# Patient Record
Sex: FEMALE | Race: WHITE | URBAN - METROPOLITAN AREA
[De-identification: names, ages, dates, MRNs, and addresses within clinical notes are randomized per-mention and may not be internally consistent; named-entity substitution may affect disease eponyms.]

---

## 2023-07-21 LAB
AMB EXT BILIRUBIN, TOTAL: 0.8 MG/DL
AMB EXT BUN: 21 MG/DL
AMB EXT CALCIUM: 9.6
AMB EXT CARBON DIOXIDE: 25
AMB EXT CHLORIDE: 104
AMB EXT CHOLESTEROL, TOTAL: 207 MG/DL
AMB EXT CMP ALT: 20 U/L
AMB EXT CMP AST: 14 U/L
AMB EXT CREATININE: 0.59 MG/DL
AMB EXT GLUCOSE: 122 MG/DL
AMB EXT HDL CHOLESTEROL: 49 MG/DL
AMB EXT HGBA1C: 5.9 %
AMB EXT LDL CHOLESTEROL, DIRECT: 139 MG/DL
AMB EXT POSTASSIUM: 4.3 MMOL/L
AMB EXT SODIUM: 143 MMOL/L
AMB EXT TOTAL PROTEIN: 6.4
AMB EXT TRIGLYCERIDES: 107 MG/DL
AMB EXT TSH: 1.14 MIU/ML
AMB EXT VLDL: 19 MG/DL

## 2023-08-14 ENCOUNTER — TELEPHONE (OUTPATIENT)
Dept: ENDOCRINOLOGY CLINIC | Facility: CLINIC | Age: 63
End: 2023-08-14

## 2023-08-14 ENCOUNTER — OFFICE VISIT (OUTPATIENT)
Dept: ENDOCRINOLOGY CLINIC | Facility: CLINIC | Age: 63
End: 2023-08-14
Payer: COMMERCIAL

## 2023-08-14 VITALS
DIASTOLIC BLOOD PRESSURE: 78 MMHG | OXYGEN SATURATION: 100 % | HEART RATE: 57 BPM | BODY MASS INDEX: 17 KG/M2 | SYSTOLIC BLOOD PRESSURE: 126 MMHG | RESPIRATION RATE: 20 BRPM | WEIGHT: 103 LBS

## 2023-08-14 DIAGNOSIS — E78.2 MIXED HYPERLIPIDEMIA: ICD-10-CM

## 2023-08-14 DIAGNOSIS — I10 PRIMARY HYPERTENSION: ICD-10-CM

## 2023-08-14 DIAGNOSIS — R73.03 PREDIABETES: Primary | ICD-10-CM

## 2023-08-14 LAB
CREAT UR-SCNC: 23.7 MG/DL
EGFR-CR: 100 ML/MIN/1.73M2 (ref 60–?)
MICROALBUMIN UR-MCNC: 4.4 MG/DL
MICROALBUMIN/CREAT 24H UR-RTO: 185.7 UG/MG (ref ?–30)

## 2023-08-14 PROCEDURE — 82043 UR ALBUMIN QUANTITATIVE: CPT | Performed by: NURSE PRACTITIONER

## 2023-08-14 PROCEDURE — 82570 ASSAY OF URINE CREATININE: CPT | Performed by: NURSE PRACTITIONER

## 2023-08-14 RX ORDER — IRBESARTAN 150 MG/1
150 TABLET ORAL NIGHTLY
COMMUNITY

## 2023-08-14 RX ORDER — METOPROLOL SUCCINATE 50 MG/1
50 TABLET, EXTENDED RELEASE ORAL DAILY
COMMUNITY

## 2023-08-14 RX ORDER — ALPRAZOLAM 0.5 MG/1
0.5 TABLET, EXTENDED RELEASE ORAL EVERY MORNING
COMMUNITY

## 2023-08-14 RX ORDER — OMEGA-3/DHA/EPA/FISH OIL 60 MG-90MG
CAPSULE ORAL
COMMUNITY

## 2023-08-14 RX ORDER — BLOOD-GLUCOSE SENSOR
1 EACH MISCELLANEOUS
Qty: 2 EACH | Refills: 11 | Status: SHIPPED | OUTPATIENT
Start: 2023-08-14

## 2023-08-14 RX ORDER — ASCORBIC ACID 500 MG
500 TABLET ORAL DAILY
COMMUNITY

## 2023-08-16 DIAGNOSIS — R73.03 PREDIABETES: Primary | ICD-10-CM

## 2023-09-22 ENCOUNTER — DIABETIC EDUCATION (OUTPATIENT)
Dept: ENDOCRINOLOGY CLINIC | Facility: CLINIC | Age: 63
End: 2023-09-22
Payer: COMMERCIAL

## 2023-09-22 VITALS — WEIGHT: 103.38 LBS | BODY MASS INDEX: 16.61 KG/M2 | HEIGHT: 65.98 IN

## 2023-09-22 DIAGNOSIS — R73.03 PREDIABETES: Primary | ICD-10-CM

## 2023-09-22 NOTE — PATIENT INSTRUCTIONS
Irina Hand, it was a pleasure meeting you today. Below is a summary of our visit. Recommendations  Practice healthful eating patterns, emphasizing a variety of nutrient dense foods in appropriate portion sizes. Achieve and maintain weight gain goals, increase wt by 5%. Increase carb intake to at least 15 grams per meal/snack. Make sure your drinking at least 40 oz of water daily    Blood Glucose Goals  Blood sugar goals: less than 125 mg/dl in the morning and less than 150 mg/dl 2 hours after meals. Keep glucose tabs or fast acting carbohydrates with you for treatment of lows; for blood glucose levels less than 70 mg/dl, take 15 grams of fast acting carbohydrates (3-4 glucose tabs, 4 ounces of juice, or as discussed). Retest in 15 min. If not above 70 mg/dl, retreat. Follow up: Recommended in 1 month  Please call the [facility] with any questions or concerns [phone].     Saira Cat RDN, LDN, [facility]  Certified Diabetes Care and   Registered Dietitian

## 2023-10-18 ENCOUNTER — LAB ENCOUNTER (OUTPATIENT)
Dept: LAB | Age: 63
End: 2023-10-18
Attending: NURSE PRACTITIONER
Payer: COMMERCIAL

## 2023-10-18 DIAGNOSIS — R73.03 PREDIABETES: ICD-10-CM

## 2023-10-18 PROCEDURE — 82570 ASSAY OF URINE CREATININE: CPT | Performed by: NURSE PRACTITIONER

## 2023-10-18 PROCEDURE — 82043 UR ALBUMIN QUANTITATIVE: CPT | Performed by: NURSE PRACTITIONER

## 2023-10-19 LAB
CREAT UR-SCNC: 92.1 MG/DL
MICROALBUMIN UR-MCNC: 6.58 MG/DL
MICROALBUMIN/CREAT 24H UR-RTO: 71.4 UG/MG (ref ?–30)

## 2024-01-23 ENCOUNTER — TELEPHONE (OUTPATIENT)
Dept: ENDOCRINOLOGY CLINIC | Facility: CLINIC | Age: 64
End: 2024-01-23

## 2024-01-23 DIAGNOSIS — E78.2 MIXED HYPERLIPIDEMIA: ICD-10-CM

## 2024-01-23 DIAGNOSIS — E11.9 TYPE 2 DIABETES MELLITUS WITHOUT COMPLICATION, WITHOUT LONG-TERM CURRENT USE OF INSULIN (HCC): Primary | ICD-10-CM

## 2024-01-23 DIAGNOSIS — R73.03 PREDIABETES: ICD-10-CM

## 2024-01-23 NOTE — TELEPHONE ENCOUNTER
Ppt returned call and wanted to update phone number.  Pt has appt with Zuri and plans to go to lab in Feb.  Pended annual labs, last microalb was 10/2023 and 08/2023.  Does this also need to be repeated?    LOV:08/14/2023  Future Appointments   Date Time Provider Department Center   2/12/2024  4:00 PM Zuri Aldana, [facility]     Last A1c value was 5.9% done 7/21/2023.

## 2024-02-02 ENCOUNTER — TELEPHONE (OUTPATIENT)
Dept: ENDOCRINOLOGY CLINIC | Facility: CLINIC | Age: 64
End: 2024-02-02

## 2024-02-02 NOTE — TELEPHONE ENCOUNTER
Pt called wanted to see if she needed to schedule an appointment to get her labs done. Efra generally does not need an appointment, but you can schedule one if she'd like. Also, pt notified pt that she does not have to be fasting for her labs.    Provided hours of lab and phone number to [facility]. No further questions     [place] office on [address]  Monday-Friday 7am-4pm  Saturday 7am-1pm   Phone number: [phone]

## 2024-02-07 ENCOUNTER — LAB ENCOUNTER (OUTPATIENT)
Dept: LAB | Age: 64
End: 2024-02-07
Attending: NURSE PRACTITIONER
Payer: COMMERCIAL

## 2024-02-07 DIAGNOSIS — E11.9 TYPE 2 DIABETES MELLITUS WITHOUT COMPLICATION, WITHOUT LONG-TERM CURRENT USE OF INSULIN (HCC): ICD-10-CM

## 2024-02-07 DIAGNOSIS — E78.2 MIXED HYPERLIPIDEMIA: ICD-10-CM

## 2024-02-07 LAB
ALBUMIN SERPL-MCNC: 3.8 G/DL (ref 3.4–5)
ALBUMIN/GLOB SERPL: 1.3 {RATIO} (ref 1–2)
ALP LIVER SERPL-CCNC: 45 U/L
ALT SERPL-CCNC: 29 U/L
ANION GAP SERPL CALC-SCNC: 4 MMOL/L (ref 0–18)
AST SERPL-CCNC: 24 U/L (ref 15–37)
BILIRUB SERPL-MCNC: 1 MG/DL (ref 0.1–2)
BUN BLD-MCNC: 19 MG/DL (ref 9–23)
CALCIUM BLD-MCNC: 9.2 MG/DL (ref 8.5–10.1)
CHLORIDE SERPL-SCNC: 106 MMOL/L (ref 98–112)
CHOLEST SERPL-MCNC: 192 MG/DL (ref ?–200)
CO2 SERPL-SCNC: 31 MMOL/L (ref 21–32)
CREAT BLD-MCNC: 0.6 MG/DL
CREAT UR-SCNC: 69.1 MG/DL
EGFRCR SERPLBLD CKD-EPI 2021: 100 ML/MIN/1.73M2 (ref 60–?)
EST. AVERAGE GLUCOSE BLD GHB EST-MCNC: 131 MG/DL (ref 68–126)
FASTING PATIENT LIPID ANSWER: YES
FASTING STATUS PATIENT QL REPORTED: YES
GLOBULIN PLAS-MCNC: 2.9 G/DL (ref 2.8–4.4)
GLUCOSE BLD-MCNC: 133 MG/DL (ref 70–99)
HBA1C MFR BLD: 6.2 % (ref ?–5.7)
HDLC SERPL-MCNC: 39 MG/DL (ref 40–59)
LDLC SERPL CALC-MCNC: 141 MG/DL (ref ?–100)
MICROALBUMIN UR-MCNC: 4.07 MG/DL
MICROALBUMIN/CREAT 24H UR-RTO: 58.9 UG/MG (ref ?–30)
NONHDLC SERPL-MCNC: 153 MG/DL (ref ?–130)
OSMOLALITY SERPL CALC.SUM OF ELEC: 296 MOSM/KG (ref 275–295)
POTASSIUM SERPL-SCNC: 3.6 MMOL/L (ref 3.5–5.1)
PROT SERPL-MCNC: 6.7 G/DL (ref 6.4–8.2)
SODIUM SERPL-SCNC: 141 MMOL/L (ref 136–145)
TRIGL SERPL-MCNC: 66 MG/DL (ref 30–149)
VLDLC SERPL CALC-MCNC: 12 MG/DL (ref 0–30)

## 2024-02-07 PROCEDURE — 80061 LIPID PANEL: CPT | Performed by: NURSE PRACTITIONER

## 2024-02-07 PROCEDURE — 80053 COMPREHEN METABOLIC PANEL: CPT | Performed by: NURSE PRACTITIONER

## 2024-02-07 PROCEDURE — 82043 UR ALBUMIN QUANTITATIVE: CPT | Performed by: NURSE PRACTITIONER

## 2024-02-07 PROCEDURE — 82570 ASSAY OF URINE CREATININE: CPT | Performed by: NURSE PRACTITIONER

## 2024-02-07 PROCEDURE — 83036 HEMOGLOBIN GLYCOSYLATED A1C: CPT | Performed by: NURSE PRACTITIONER

## 2024-02-12 ENCOUNTER — OFFICE VISIT (OUTPATIENT)
Dept: ENDOCRINOLOGY CLINIC | Facility: CLINIC | Age: 64
End: 2024-02-12
Payer: COMMERCIAL

## 2024-02-12 VITALS
HEART RATE: 88 BPM | RESPIRATION RATE: 16 BRPM | DIASTOLIC BLOOD PRESSURE: 72 MMHG | SYSTOLIC BLOOD PRESSURE: 126 MMHG | WEIGHT: 103.19 LBS | BODY MASS INDEX: 17 KG/M2 | OXYGEN SATURATION: 98 %

## 2024-02-12 DIAGNOSIS — R80.9 TYPE 2 DIABETES MELLITUS WITH MICROALBUMINURIA, WITHOUT LONG-TERM CURRENT USE OF INSULIN  (HCC): Primary | ICD-10-CM

## 2024-02-12 DIAGNOSIS — E78.2 MIXED HYPERLIPIDEMIA: ICD-10-CM

## 2024-02-12 DIAGNOSIS — I10 PRIMARY HYPERTENSION: ICD-10-CM

## 2024-02-12 DIAGNOSIS — E11.29 TYPE 2 DIABETES MELLITUS WITH MICROALBUMINURIA, WITHOUT LONG-TERM CURRENT USE OF INSULIN  (HCC): Primary | ICD-10-CM

## 2024-02-12 PROCEDURE — 3078F DIAST BP <80 MM HG: CPT | Performed by: NURSE PRACTITIONER

## 2024-02-12 PROCEDURE — 3074F SYST BP LT 130 MM HG: CPT | Performed by: NURSE PRACTITIONER

## 2024-02-12 PROCEDURE — 99214 OFFICE O/P EST MOD 30 MIN: CPT | Performed by: NURSE PRACTITIONER

## 2024-02-12 PROCEDURE — 95251 CONT GLUC MNTR ANALYSIS I&R: CPT | Performed by: NURSE PRACTITIONER

## 2024-02-12 RX ORDER — IRBESARTAN AND HYDROCHLOROTHIAZIDE 150; 12.5 MG/1; MG/1
1 TABLET, FILM COATED ORAL DAILY
COMMUNITY

## 2024-02-12 NOTE — PROGRESS NOTES
HPI:   Carolina Humphreys is a 63 year old female who presents for initial visit with provider for the management of her diabetes.  Patient accompanied with  to appointment.    Chief Complaint   Patient presents with    Diabetes     Follow up (aryan)        Diabetes: stable  Type: pre DM - hx GDM x 2  Duration:dx pre DM last year  Current Meds: none   SE: n/a  Long term insulin use: n/a  Failed Meds: none    Complications: Proteinuria    Personal Freestyle Aryan 3 CGM  Analysis of data: 2024 - 2024  % Time CGM is Active: 95%  Sensor download: full report  in media  Average glucose : 141 mg/dl   GMI: 6.7%    CV (coefficient of variation) : 11.2%     3% time above 180mg /dl   0% time above 250 mg/dl  97% time in target range:  mg/dl   0% time below target range: 70mg/dl     Evaluation   1. Baseline glucose stable and in target range however at times somewhat elevated  2. Minimal postprandial elevations  3. Low likelihood of hypoglycemia  4. Minimal glucose variability         Overall glucose control:   HGBA1C:    Lab Results   Component Value Date    A1C 6.2 (H) 2024    A1C 5.9 2023     (H) 2024       Hypertension: stable  Blood Pressure: 126/72   Medication prescribed: irbesartan-hydrochlorothiazide , metoprolol  SE: denies     Hyperlipidemia: needs improvement  LDL: 141   Tri  Medication prescribed: Omega 3  SE: denies     Wt Readings from Last 3 Encounters:   24 103 lb 3.2 oz (46.8 kg)   23 103 lb 6.4 oz (46.9 kg)   23 103 lb (46.7 kg)     BP Readings from Last 3 Encounters:   24 126/72   23 126/78   19 (!) 166/84          Past History:   She  has a past medical history of Pre-diabetes.   Her family history is not on file.   She  reports that she has never smoked. She has never used smokeless tobacco. She reports that she does not currently use alcohol. She reports that she does not use drugs.     She is allergic to  penicillins.     Current Outpatient Medications on File Prior to Visit   Medication Sig    Irbesartan-hydroCHLOROthiazide 150-12.5 MG Oral Tab Take 1 tablet by mouth daily.    metoprolol succinate ER 50 MG Oral Tablet 24 Hr Take 1 tablet (50 mg total) by mouth daily.    Multiple Vitamins-Minerals (CENTRUM SILVER 50+WOMEN OR) Take by mouth.    Omega-3 Fatty Acids (FISH OIL) 500 MG Oral Cap Take by mouth.    Vitamin C 500 MG Oral Tab Take 1 tablet (500 mg total) by mouth daily.    ALPRAZolam ER 0.5 MG Oral Tablet 24 Hr Take 1 tablet (0.5 mg total) by mouth every morning.    [employer] EULA 3 SENSOR Does not apply Misc 1 each every 14 (fourteen) days.     No current facility-administered medications on file prior to visit.       CMP  (most recent labs)   Lab Results   Component Value Date/Time     (H) 02/07/2024 07:31 AM    BUN 19 02/07/2024 07:31 AM    CREATSERUM 0.60 02/07/2024 07:31 AM    EGFRCR 100 02/07/2024 07:31 AM    CA 9.2 02/07/2024 07:31 AM    ALKPHO 45 (L) 02/07/2024 07:31 AM    AST 24 02/07/2024 07:31 AM    ALT 29 02/07/2024 07:31 AM    BILT 1.0 02/07/2024 07:31 AM    TP 6.7 02/07/2024 07:31 AM    ALB 3.8 02/07/2024 07:31 AM     02/07/2024 07:31 AM    K 3.6 02/07/2024 07:31 AM     02/07/2024 07:31 AM    CO2 31.0 02/07/2024 07:31 AM           Lipids  (most recent labs)   Lab Results   Component Value Date/Time    CHOLEST 192 02/07/2024 07:31 AM    TRIG 66 02/07/2024 07:31 AM    HDL 39 (L) 02/07/2024 07:31 AM     (H) 02/07/2024 07:31 AM    NONHDLC 153 (H) 02/07/2024 07:31 AM          Diabetes  (most recent labs)   Lab Results   Component Value Date/Time    A1C 6.2 (H) 02/07/2024 07:31 AM          Microalb (most recent labs)   Lab Results   Component Value Date/Time    MICROALBCREA 58.9 (H) 02/07/2024 07:31 AM        Lab results reviewed with patient.    REVIEW OF SYSTEMS:   GENERAL HEALTH: feels well otherwise  SKIN: denies any unusual skin lesions or rashes  RESPIRATORY: denies  shortness of breath with exertion  CARDIOVASCULAR: denies chest pain on exertion  GI: denies nausea, abdominal pain or heartburn  NEURO: denies headaches and denies numbness and tingling to extremities  ENDO: denies polydipsia, polyuria or polyphagia, denies unexplained weight changes    EXAM:   /72   Pulse 88   Resp 16   Wt 103 lb 3.2 oz (46.8 kg)   SpO2 98%   BMI 16.66 kg/m²  Estimated body mass index is 16.66 kg/m² as calculated from the following:    Height as of 9/22/23: 5' 5.98\" (1.676 m).    Weight as of this encounter: 103 lb 3.2 oz (46.8 kg).   Physical Exam  Vitals reviewed.   Constitutional:       Appearance: Normal appearance.   Pulmonary:      Effort: Pulmonary effort is normal.   Neurological:      Mental Status: She is alert and oriented to person, place, and time.   Psychiatric:         Mood and Affect: Mood normal.         Behavior: Behavior normal.         ASSESSMENT AND PLAN:   As for her Diabetes, it is stable.   Recommendations are: check feet daily.    Discussed Metformin therapy and SGLT2 therapy options, patient declines at this time however will continue to monitor closely.    Patient to continue to use personal Freestyle Aryan 3 CGM for glucose monitoring.  Discussed self monitoring blood glucose and the importance of monitoring.  Patient agreed to monitoring daily - alternating fasting and 2 hours postprandial of one meal per day if not using CGM.    Reinforced healthy eating in healthy portions and increasing daily physical activity.    Reviewed ways to improve the protein in the urine:   Keep blood sugars in target range   Keep blood pressure in target range   Watch over the counter medicines like NSAID (non steroidal anti-inflammatory drugs) these can be harmful to  kidneys (examples include: , Motrin , Advil, ibuprofen, naprosyn, Aleve)   Continue ARB therapy - renoprotective       As for her hypertension, Blood Pressure is well controlled, stable, no significant medication  side effects noted.   PLAN: will continue present medications, reviewed diet, exercise and weight control       As for her cholesterol, Lipids are needs further observation, needs improvement, needs to follow diet more regularly.   PLAN: will continue present medications, reviewed diet, exercise and weight control  Discussed ACC/AHA/ADA guidelines for initiation of statin therapy.  Patient verbalizes understanding but declines at this time.  Will continue to monitor closely.      DM Health Maintenance  Last dilated eye exam: 2023  Last diabetic foot exam: Last Foot Exam: 23    Date of last PHQ-2 depression screen: PHQ-2 - Date of last depression screenin2024    Patient  reports that she has never smoked. She has never used smokeless tobacco.  When is flu vaccine due? Influenza Vaccine(1) Never done  When is pneumonia vaccine due? Pneumococcal Vaccination(1 of 2 - PCV) Never done    The patient indicates understanding of these issues and agrees to the plan.  Refills addressed at time of office visit.    Diagnoses and all orders for this visit:    Type 2 diabetes mellitus with microalbuminuria, without long-term current use of insulin  (HCC)    Primary hypertension    Mixed hyperlipidemia         Return in about 6 months (around 2024) for 45 minute appointment, Personal CGM.    The risks and benefits of my recommendations, as well as other treatment options were discussed with the patient today. Questions were answered to the best of my knowledge.   40 min spent with patient and >50% time spent counseling and coordinating care related to their office visit.      Zuri SWAIN, BC-ADM, [facility]

## 2024-02-13 PROBLEM — R80.9 TYPE 2 DIABETES MELLITUS WITH MICROALBUMINURIA, WITHOUT LONG-TERM CURRENT USE OF INSULIN (HCC): Status: ACTIVE | Noted: 2024-02-13

## 2024-02-13 PROBLEM — I10 PRIMARY HYPERTENSION: Status: ACTIVE | Noted: 2024-02-13

## 2024-02-13 PROBLEM — E11.29 TYPE 2 DIABETES MELLITUS WITH MICROALBUMINURIA, WITHOUT LONG-TERM CURRENT USE OF INSULIN  (HCC): Status: ACTIVE | Noted: 2024-02-13

## 2024-02-13 PROBLEM — E11.29 TYPE 2 DIABETES MELLITUS WITH MICROALBUMINURIA, WITHOUT LONG-TERM CURRENT USE OF INSULIN (HCC): Status: ACTIVE | Noted: 2024-02-13

## 2024-02-13 PROBLEM — E78.2 MIXED HYPERLIPIDEMIA: Status: ACTIVE | Noted: 2024-02-13

## 2024-02-13 PROBLEM — R80.9 TYPE 2 DIABETES MELLITUS WITH MICROALBUMINURIA, WITHOUT LONG-TERM CURRENT USE OF INSULIN  (HCC): Status: ACTIVE | Noted: 2024-02-13

## 2024-08-02 ENCOUNTER — TELEPHONE (OUTPATIENT)
Dept: ENDOCRINOLOGY CLINIC | Facility: CLINIC | Age: 64
End: 2024-08-02

## 2024-08-02 DIAGNOSIS — R80.9 TYPE 2 DIABETES MELLITUS WITH MICROALBUMINURIA, WITHOUT LONG-TERM CURRENT USE OF INSULIN (HCC): Primary | ICD-10-CM

## 2024-08-02 DIAGNOSIS — E11.29 TYPE 2 DIABETES MELLITUS WITH MICROALBUMINURIA, WITHOUT LONG-TERM CURRENT USE OF INSULIN (HCC): Primary | ICD-10-CM

## 2024-08-02 NOTE — TELEPHONE ENCOUNTER
Pt called in - wanted to get A1c drawn before upcoming appt. Pended lab order and provided pt with phone number for lab since she was wanting to make an appt

## 2024-08-06 ENCOUNTER — TELEPHONE (OUTPATIENT)
Dept: ENDOCRINOLOGY CLINIC | Facility: CLINIC | Age: 64
End: 2024-08-06

## 2024-08-06 NOTE — TELEPHONE ENCOUNTER
Patient has appointment Monday with Zuri SWAIN, asked if she needed to fast. Does not need to fast, should go to lab by Friday or Saturday.  Future Appointments   Date Time Provider Department Center   8/12/2024  3:45 PM Zuri Aldana APRN [facility]

## 2024-08-08 ENCOUNTER — LAB ENCOUNTER (OUTPATIENT)
Dept: LAB | Age: 64
End: 2024-08-08
Attending: NURSE PRACTITIONER
Payer: COMMERCIAL

## 2024-08-08 DIAGNOSIS — E11.29 TYPE 2 DIABETES MELLITUS WITH MICROALBUMINURIA, WITHOUT LONG-TERM CURRENT USE OF INSULIN (HCC): ICD-10-CM

## 2024-08-08 DIAGNOSIS — R80.9 TYPE 2 DIABETES MELLITUS WITH MICROALBUMINURIA, WITHOUT LONG-TERM CURRENT USE OF INSULIN (HCC): ICD-10-CM

## 2024-08-08 LAB
EST. AVERAGE GLUCOSE BLD GHB EST-MCNC: 134 MG/DL (ref 68–126)
HBA1C MFR BLD: 6.3 % (ref ?–5.7)

## 2024-08-08 PROCEDURE — 83036 HEMOGLOBIN GLYCOSYLATED A1C: CPT | Performed by: NURSE PRACTITIONER

## 2024-08-12 ENCOUNTER — OFFICE VISIT (OUTPATIENT)
Dept: ENDOCRINOLOGY CLINIC | Facility: CLINIC | Age: 64
End: 2024-08-12
Payer: COMMERCIAL

## 2024-08-12 ENCOUNTER — TELEPHONE (OUTPATIENT)
Dept: ENDOCRINOLOGY CLINIC | Facility: CLINIC | Age: 64
End: 2024-08-12

## 2024-08-12 VITALS
SYSTOLIC BLOOD PRESSURE: 110 MMHG | BODY MASS INDEX: 17 KG/M2 | OXYGEN SATURATION: 98 % | WEIGHT: 106 LBS | HEART RATE: 39 BPM | RESPIRATION RATE: 20 BRPM | DIASTOLIC BLOOD PRESSURE: 72 MMHG

## 2024-08-12 DIAGNOSIS — I10 PRIMARY HYPERTENSION: ICD-10-CM

## 2024-08-12 DIAGNOSIS — E78.2 MIXED HYPERLIPIDEMIA: ICD-10-CM

## 2024-08-12 DIAGNOSIS — E11.29 TYPE 2 DIABETES MELLITUS WITH MICROALBUMINURIA, WITHOUT LONG-TERM CURRENT USE OF INSULIN (HCC): Primary | ICD-10-CM

## 2024-08-12 DIAGNOSIS — R80.9 TYPE 2 DIABETES MELLITUS WITH MICROALBUMINURIA, WITHOUT LONG-TERM CURRENT USE OF INSULIN (HCC): Primary | ICD-10-CM

## 2024-08-12 PROBLEM — R73.03 PRE-DIABETES: Status: RESOLVED | Noted: 2023-08-14 | Resolved: 2024-08-12

## 2024-08-12 PROBLEM — R61 HYPERHIDROSIS: Status: ACTIVE | Noted: 2019-02-04

## 2024-08-12 PROCEDURE — 3074F SYST BP LT 130 MM HG: CPT | Performed by: NURSE PRACTITIONER

## 2024-08-12 PROCEDURE — 95251 CONT GLUC MNTR ANALYSIS I&R: CPT | Performed by: NURSE PRACTITIONER

## 2024-08-12 PROCEDURE — 99214 OFFICE O/P EST MOD 30 MIN: CPT | Performed by: NURSE PRACTITIONER

## 2024-08-12 PROCEDURE — 3078F DIAST BP <80 MM HG: CPT | Performed by: NURSE PRACTITIONER

## 2024-08-12 RX ORDER — BLOOD-GLUCOSE SENSOR
1 EACH MISCELLANEOUS
Qty: 2 EACH | Refills: 11 | Status: SHIPPED | OUTPATIENT
Start: 2024-08-12

## 2024-08-12 NOTE — PROGRESS NOTES
HPI:   Carolina Humphreys is a 64 year old female who presents for follow up for the management of her diabetes.  Patient accompanied with  to appointment.    Chief Complaint   Patient presents with    Diabetes     F/U - mila jacob       Diabetes: stable, at goal  Type: 2 - hx GDM x 2  Duration:dx pre DM last year  Current Meds: none   SE: n/a  Long term insulin use: n/a  Failed Meds: none    Complications: Proteinuria    Personal Freestyle Aryan 3 CGM  Analysis of data: 2024 - 2024  % Time CGM is Active: 80%  Sensor download: full report  in media  Average glucose : 121 mg/dl   GMI: 6.2%    CV (coefficient of variation) : 12.1%     0% time above 180mg /dl   0% time above 250 mg/dl  100% time in target range:  mg/dl   0% time below target range: 70mg/dl     Evaluation   1. Baseline glucose stable and in target range   2. Minimal postprandial elevations  3. Low likelihood of hypoglycemia  4. Minimal glucose variability         Overall glucose control:   HGBA1C:    Lab Results   Component Value Date    A1C 6.3 (H) 2024    A1C 6.2 (H) 2024    A1C 5.9 2023     (H) 2024       Hypertension: stable, at goal  Blood Pressure: 110/72   Medication prescribed: irbesartan-hydrochlorothiazide , metoprolol  SE: denies     Hyperlipidemia: needs improvement  LDL: 141   Tri  Medication prescribed: Omega 3  SE: denies     Wt Readings from Last 3 Encounters:   24 106 lb (48.1 kg)   24 103 lb 3.2 oz (46.8 kg)   23 103 lb 6.4 oz (46.9 kg)     BP Readings from Last 3 Encounters:   24 110/72   24 126/72   23 126/78          Past History:   She  has a past medical history of Pre-diabetes.   Her family history is not on file.   She  reports that she has never smoked. She has never used smokeless tobacco. She reports that she does not currently use alcohol. She reports that she does not use drugs.     She is allergic to penicillins.     Current  Outpatient Medications on File Prior to Visit   Medication Sig    Irbesartan-hydroCHLOROthiazide 150-12.5 MG Oral Tab Take 1 tablet by mouth daily.    ALPRAZolam ER 0.5 MG Oral Tablet 24 Hr Take 1 tablet (0.5 mg total) by mouth every morning.    metoprolol succinate ER 50 MG Oral Tablet 24 Hr Take 1 tablet (50 mg total) by mouth daily.    Multiple Vitamins-Minerals (CENTRUM SILVER 50+WOMEN OR) Take by mouth.    Omega-3 Fatty Acids (FISH OIL) 500 MG Oral Cap Take by mouth.    Vitamin C 500 MG Oral Tab Take 1 tablet (500 mg total) by mouth daily.    [DISCONTINUED] FREESTYLE EULA 3 SENSOR Does not apply Misc 1 each every 14 (fourteen) days.     No current facility-administered medications on file prior to visit.       CMP  (most recent labs)   Lab Results   Component Value Date/Time     (H) 02/07/2024 07:31 AM    BUN 19 02/07/2024 07:31 AM    CREATSERUM 0.60 02/07/2024 07:31 AM    EGFRCR 100 02/07/2024 07:31 AM    CA 9.2 02/07/2024 07:31 AM    ALKPHO 45 (L) 02/07/2024 07:31 AM    AST 24 02/07/2024 07:31 AM    ALT 29 02/07/2024 07:31 AM    BILT 1.0 02/07/2024 07:31 AM    TP 6.7 02/07/2024 07:31 AM    ALB 3.8 02/07/2024 07:31 AM     02/07/2024 07:31 AM    K 3.6 02/07/2024 07:31 AM     02/07/2024 07:31 AM    CO2 31.0 02/07/2024 07:31 AM           Lipids  (most recent labs)   Lab Results   Component Value Date/Time    CHOLEST 192 02/07/2024 07:31 AM    TRIG 66 02/07/2024 07:31 AM    HDL 39 (L) 02/07/2024 07:31 AM     (H) 02/07/2024 07:31 AM    NONHDLC 153 (H) 02/07/2024 07:31 AM          Diabetes  (most recent labs)   Lab Results   Component Value Date/Time    A1C 6.3 (H) 08/08/2024 02:51 PM          Microalb (most recent labs)   Lab Results   Component Value Date/Time    MICROALBCREA 58.9 (H) 02/07/2024 07:31 AM        Lab results reviewed with patient.    REVIEW OF SYSTEMS:   GENERAL HEALTH: feels well otherwise  SKIN: denies any unusual skin lesions or rashes  RESPIRATORY: denies shortness of  breath with exertion  CARDIOVASCULAR: denies chest pain on exertion  GI: denies nausea, abdominal pain or heartburn  NEURO: denies headaches and denies numbness and tingling to extremities  ENDO: denies polydipsia, polyuria or polyphagia, denies unexplained weight changes    EXAM:   /72   Pulse (!) 39   Resp 20   Wt 106 lb (48.1 kg)   SpO2 98%   BMI 17.12 kg/m²  Estimated body mass index is 17.12 kg/m² as calculated from the following:    Height as of 9/22/23: 5' 5.98\" (1.676 m).    Weight as of this encounter: 106 lb (48.1 kg).   Physical Exam  Vitals reviewed.   Constitutional:       Appearance: Normal appearance.   Pulmonary:      Effort: Pulmonary effort is normal.   Neurological:      Mental Status: She is alert and oriented to person, place, and time.   Psychiatric:         Mood and Affect: Mood normal.         Behavior: Behavior normal.         ASSESSMENT AND PLAN:   As for her Diabetes, it is well controlled, stable.   Recommendations are: check feet daily.    Discussed Metformin therapy and SGLT2 therapy options, patient declines.    Patient to continue to use personal Freestyle Aryan 3 CGM for glucose monitoring.  Discussed self monitoring blood glucose and the importance of monitoring.  Patient agreed to monitoring daily - alternating fasting and 2 hours postprandial of one meal per day if not using CGM.    Reinforced healthy eating in healthy portions and increasing daily physical activity.    Reviewed ways to improve the protein in the urine:   Keep blood sugars in target range   Keep blood pressure in target range   Watch over the counter medicines like NSAID (non steroidal anti-inflammatory drugs) these can be harmful to  kidneys (examples include: , Motrin , Advil, ibuprofen, naprosyn, Aleve)   Continue ARB therapy - renoprotective       As for her hypertension, Blood Pressure is well controlled, stable, no significant medication side effects noted.   PLAN: will continue present  medications, reviewed diet, exercise and weight control       As for her cholesterol, Lipids are needs further observation, needs improvement, needs to follow diet more regularly.   PLAN: will continue present medications, reviewed diet, exercise and weight control  Discussed ACC/AHA/ADA guidelines for initiation of statin therapy.  Patient verbalizes understanding but declines at this time.  Will continue to monitor closely.    Due to patient's stable diabetes management at goal without medication regimen at this time patient's diabetes management released back to PCP.  Patient amendable to plan and verbalizes understanding to return if negative change in management.    DM Health Maintenance  Last dilated eye exam: 2023  Last diabetic foot exam: Last Foot Exam: 23    Date of last PHQ-2 depression screen: PHQ-2 - Date of last depression screenin2024    Patient  reports that she has never smoked. She has never used smokeless tobacco.  When is flu vaccine due? No recommendations at this time  When is pneumonia vaccine due? Pneumococcal Vaccination(1 of 2 - PCV) Never done    The patient indicates understanding of these issues and agrees to the plan.  Refills addressed at time of office visit.    Diagnoses and all orders for this visit:    Type 2 diabetes mellitus with microalbuminuria, without long-term current use of insulin (HCC)  -     FREESTYLE EULA 3 SENSOR Does not apply Misc; 1 each every 14 (fourteen) days.    Primary hypertension    Mixed hyperlipidemia           Return if symptoms worsen or fail to improve.    The risks and benefits of my recommendations, as well as other treatment options were discussed with the patient today. Questions were answered to the best of my knowledge.   20 min spent with patient and >50% time spent counseling and coordinating care related to their office visit.      Zuri SWAIN, BC-ADM, [facility]

## 2024-09-28 DIAGNOSIS — E11.29 TYPE 2 DIABETES MELLITUS WITH MICROALBUMINURIA, WITHOUT LONG-TERM CURRENT USE OF INSULIN (HCC): ICD-10-CM

## 2024-09-28 DIAGNOSIS — R80.9 TYPE 2 DIABETES MELLITUS WITH MICROALBUMINURIA, WITHOUT LONG-TERM CURRENT USE OF INSULIN (HCC): ICD-10-CM

## 2024-09-30 RX ORDER — BLOOD-GLUCOSE SENSOR
1 EACH MISCELLANEOUS
Qty: 2 EACH | Refills: 1 | Status: SHIPPED | OUTPATIENT
Start: 2024-09-30

## 2024-09-30 NOTE — TELEPHONE ENCOUNTER
Requested Prescriptions     Pending Prescriptions Disp Refills    FREESTYLE EULA 3 SENSOR Does not apply Misc [Pharmacy Med Name: FREESTYLE EULA 3 SENSOR] 2 each 1     Sig: USE AS DIRECTED AND CHANGE EVERY 14 DAYS     HGBA1C:    Lab Results   Component Value Date    A1C 6.3 (H) 08/08/2024    A1C 6.2 (H) 02/07/2024    A1C 5.9 07/21/2023     (H) 08/08/2024     Your Appointments      Wednesday November 13, 2024 3:00 PM  Exam - New Patient with Alonzo Martinez MD  [address] ([facility]) [address] [place] [id number]  [phone]             Last OFFICE VISIT: 8--DH    Last refill:  8--2 each with 11 refills -Receipt confirmed by pharmacy (8/12/2024  3:32 PM CDT)       Please deny

## 2024-11-13 ENCOUNTER — OFFICE VISIT (OUTPATIENT)
Dept: FAMILY MEDICINE CLINIC | Facility: CLINIC | Age: 64
End: 2024-11-13
Payer: COMMERCIAL

## 2024-11-13 VITALS
SYSTOLIC BLOOD PRESSURE: 128 MMHG | HEIGHT: 66 IN | BODY MASS INDEX: 17.68 KG/M2 | DIASTOLIC BLOOD PRESSURE: 68 MMHG | OXYGEN SATURATION: 98 % | RESPIRATION RATE: 20 BRPM | HEART RATE: 67 BPM | WEIGHT: 110 LBS

## 2024-11-13 DIAGNOSIS — I10 PRIMARY HYPERTENSION: ICD-10-CM

## 2024-11-13 DIAGNOSIS — Z12.31 SCREENING MAMMOGRAM FOR BREAST CANCER: ICD-10-CM

## 2024-11-13 DIAGNOSIS — Z85.43 HISTORY OF OVARIAN CANCER: ICD-10-CM

## 2024-11-13 DIAGNOSIS — Z12.11 SCREEN FOR COLON CANCER: ICD-10-CM

## 2024-11-13 DIAGNOSIS — E78.2 MIXED HYPERLIPIDEMIA: ICD-10-CM

## 2024-11-13 DIAGNOSIS — Z78.0 POSTMENOPAUSAL STATE: ICD-10-CM

## 2024-11-13 DIAGNOSIS — Z00.00 LABORATORY EXAM ORDERED AS PART OF ROUTINE GENERAL MEDICAL EXAMINATION: ICD-10-CM

## 2024-11-13 DIAGNOSIS — E11.29 TYPE 2 DIABETES MELLITUS WITH MICROALBUMINURIA, WITHOUT LONG-TERM CURRENT USE OF INSULIN (HCC): Primary | ICD-10-CM

## 2024-11-13 DIAGNOSIS — R80.9 TYPE 2 DIABETES MELLITUS WITH MICROALBUMINURIA, WITHOUT LONG-TERM CURRENT USE OF INSULIN (HCC): Primary | ICD-10-CM

## 2024-11-13 DIAGNOSIS — F41.9 ANXIETY: ICD-10-CM

## 2024-11-13 PROCEDURE — 3078F DIAST BP <80 MM HG: CPT | Performed by: FAMILY MEDICINE

## 2024-11-13 PROCEDURE — 3060F POS MICROALBUMINURIA REV: CPT | Performed by: FAMILY MEDICINE

## 2024-11-13 PROCEDURE — 3044F HG A1C LEVEL LT 7.0%: CPT | Performed by: FAMILY MEDICINE

## 2024-11-13 PROCEDURE — 3061F NEG MICROALBUMINURIA REV: CPT | Performed by: FAMILY MEDICINE

## 2024-11-13 PROCEDURE — 3008F BODY MASS INDEX DOCD: CPT | Performed by: FAMILY MEDICINE

## 2024-11-13 PROCEDURE — 99204 OFFICE O/P NEW MOD 45 MIN: CPT | Performed by: FAMILY MEDICINE

## 2024-11-13 PROCEDURE — 3074F SYST BP LT 130 MM HG: CPT | Performed by: FAMILY MEDICINE

## 2024-11-13 RX ORDER — ACETAMINOPHEN 160 MG
2000 TABLET,DISINTEGRATING ORAL DAILY
COMMUNITY

## 2024-11-13 RX ORDER — BENZONATATE 200 MG/1
CAPSULE ORAL
COMMUNITY
Start: 2024-06-23 | End: 2024-11-13

## 2024-11-13 RX ORDER — IRBESARTAN AND HYDROCHLOROTHIAZIDE 150; 12.5 MG/1; MG/1
0.5 TABLET, FILM COATED ORAL DAILY
Qty: 45 TABLET | Refills: 1 | Status: SHIPPED | OUTPATIENT
Start: 2024-11-13

## 2024-11-13 RX ORDER — ALPRAZOLAM 0.5 MG/1
0.5 TABLET, EXTENDED RELEASE ORAL NIGHTLY
Qty: 30 TABLET | Refills: 5 | Status: SHIPPED | OUTPATIENT
Start: 2024-11-13

## 2024-11-13 RX ORDER — ZINC SULFATE 50(220)MG
220 CAPSULE ORAL DAILY
COMMUNITY

## 2024-11-13 RX ORDER — METOPROLOL SUCCINATE 50 MG/1
50 TABLET, EXTENDED RELEASE ORAL DAILY
Qty: 90 TABLET | Refills: 1 | Status: SHIPPED | OUTPATIENT
Start: 2024-11-13

## 2024-11-13 NOTE — PATIENT INSTRUCTIONS
[employer]  What is Type 2 Diabetes?  Watch this clip to understand what happens within your body when you have type 2 diabetes, and the importance of keeping your blood glucose levels within a healthy range.  To watch the video:  Scan the QR code  Using your mobile device, scan the following code:  OR  Go to the website:  [place]  Enter the prescription code:  1576E    © 3748-9280 [employer]. All rights reserved. This information is not intended as a substitute for professional medical care. Always follow your healthcare professional's instructions.    Managing Type 2 Diabetes  Type 2 diabetes is a long-term (chronic) condition. Managing diabetes may mean making some tough changes. Yourhealthcare team can help you.  To manage type 2 diabetes, you'll need to balance your medicine with diet and activity. You will also need check your blood sugar. And work with yourhealthcare provider to prevent complications.    Take your medicine  You may take pills or give yourself insulin shots for diabetes. Or you may use both. Take your medicines or give yourself insulin at the right times to help you control your blood sugar. Think about ways that will help you remember to take your medicines the right way every day. Ask your healthcare provider orteam for ideas.  You may only take pills for your diabetes. But this may change. Over time, mostpeople with type 2 diabetes also need insulin.  Eat healthy  A healthy diet helps control the amount of sugar in your blood. It also helps you stay at a healthy weight. Or it helps you lose weight, if if you'reoverweight. Extra weight makes it harder to control diabetes.  Your healthcare team will help you create a plan that works for you. You don'thave to give up all the foods you like. Have meals and snacks with:  Vegetables  Fruits  Lean meats or other healthy proteins  Whole grains  Low- or nonfat dairy products     Be physically  active  Being active helps lower your blood sugar. Activity helps your body use insulinto turn food into energy. It also helps you manage your weight:  Ask your healthcare provider to help you to make an activity program that's right for you. Your program is based on your age, general health, and types of activity you enjoy. Start slow. But aim for at least 150 minutes of exercise or activity each week. Start with 30 minutes a day. Exercise in 10-minute blocks. Don’t let more than 2 days go by without being active.  Check your blood sugar  Checking your own blood sugar may be a regular part of your care. Or you may only need to check your blood sugar from time to time. Your healthcare provider will tell you how to check your blood sugar at home. Checking it tells you if your blood sugar is in your target range. Having your blood sugars within thetarget range means that you are managing your diabetes well.  If your blood sugar levels are too high or too low, your healthcare provider may suggest changes to your diet or activity level. He or she may also adjustyour medicine.  Your healthcare provider may also tell you to check your blood sugar more oftenwhen you are sick.  Take care of yourself  When you have diabetes, you may be more likely to get other health problems. They include foot, eye, heart, nerve, and kidney problems. You can help prevent these problems by controlling your blood sugar and taking good care of yourself. Your healthcare provider, nurse, diabetes educator, and others canhelp you with the following:  Checkups. You should have regular checkups with your healthcare provider. At those visits, you will have a physical exam that includes checking your feet. Your healthcare provider will also check your blood pressure and weight. Take your shoes off before your appointment starts to be sure your feet are checked.  Other exams. You'll also need eye, foot, and dental exams at least once each year.  Lab  tests. You will have blood and urine tests:  Your healthcare provider will check your hemoglobin A1C at least twice a year. This blood test shows how well you have been controlling your blood sugar over 2 to 3 months. The results help your healthcare provider manage your diabetes.    You will also have other lab tests. For example, to check for kidney problems and abnormal cholesterol levels.  Smoking. If you smoke, you will need to quit. Smoking makes it more likely to get complications from diabetes. Ask your healthcare provider about ways to quit. Also don't use e-cigarette, or vaping, products.  Vaccines. Get a yearly flu shot. And ask your healthcare provider about vaccines to prevent pneumonia, shingles, and hepatitis B.  Stress and depression  Most people have challenges throughout their lives. Living with diabetes can increase your stress. Feeling stressed or depressed can actually affect yourblood sugar levels.  Tell your healthcare provider if you are having trouble coping with diabetes.He or she can help or refer you to other providers or programs.  To learn more  Know where you can get help. You can try the following:  Support. Ask family and friends to support your efforts to take care of yourself. Or look for a diabetes support group nearby or on the internet. Check the Connect with Others link on www.diabetes.org.  Counseling. Talk with a , psychologist, psychiatrist, or other counselor.  Information. Contact the American Diabetes Association at 755-676-1197 or www.diabetes.org  Gilberto last reviewed this educational content on11/1/2019    © 1708-8142 [employer]. All rights reserved. This information is not intended as a substitute for professional medical care. Always follow yourhealthcare professional's instructions.    Type 2 Diabetes and Food Choices  You make food choices every day. Whole wheat or white bread? A side of French fries or fresh fruit? Eat now or later?  Choices about what, when, and how much you eat affect your blood sugar (glucose), and also your blood pressure and cholesterol. Understanding how food affects blood glucose is the first step in managing diabetes. And following a diabetesmeal plan can help you keep your blood glucose levels on track.   Prevent problems  Having type 2 diabetes means that your body doesn’t control blood glucose well. When blood glucose stays too high for too long, serious health problems can develop. It's important to control your blood glucose through diet, exercise, and medicine. This can delay or prevent kidney,eye, nerve, and heart disease, and other complications of diabetes.   Control carbohydrates  Carbohydrates are foods that have the biggest effect on your blood glucose levels. After you eat carbohydrates, your blood glucose rises. Fruit, sweet foods and drinks, starchy foods (such as bread, potatoes, and rice), and milk and milk products contain carbohydrates. Carbohydrates are important for health. But when you eat too many at once, your blood glucose can go too high. This is even more likely if you don't have or take enough insulin forthat food.   Some carbohydrates may raise blood glucose more than others. These include potatoes, sweets, and white bread. Better choices are less processed foods with more fiber and nutrients. Good options are 100% whole-wheat bread, oatmeal,brown rice, and nonstarchy vegetables.   Learn to use food labels that show added sugar. And try to find healthierchoices, particularly if you are overweight.    Food and medicine  Insulin helps glucose move from the blood into your muscle cells, where it can be used for energy. Some diabetes medicines that are taken by mouth help you make more insulin. Or they help your insulin work more efficiently. So your medicines and food plan have to work together. If you take insulin shots, you need to be very careful to match the amount of carbohydrates you eat  with your insulin dose. If you have too many carbohydrates without adjusting your insulin dose, your blood glucose might become too high. If you have too few carbohydrates, your blood glucose might be too low. Your healthcare provider ora dietitian can help you match your food choices to your medicine.   Have a meal plan  With certain medicines, it's best to eat the same amount of food at the same time every day. That keeps your glucose levels stable. And it helps your medicine work best. Physical activity is an important way to control blood glucose, too. Try to exercise at the same time every day. That way you can build the extra calories you need for exercise into your meal plan. With othermedicines, you may have more choices about how much you eat and when.   If you want to change your medicine to better fit your lifestyle, talk withyour healthcare provider.   Eat smart  You can eat the same foods as everyone else, but you have to carefully watch for certain details. That’s where your diabetes meal plan comes in. A personal meal plan tells you the time of day to eat meals and snacks, the types of food to eat, and how much. Itshould include your favorite foods. And it should focus on these healthy foods:   Whole grains, such as 100% whole-wheat bread, brown rice, and oatmeal  Nonfat or low-fat dairy products, such as nonfat milk and yogurt (but be sure these products don't have sugar added to make up for the fat removed)  Lean meats, poultry, fish, eggs, and dried beans and peas  Foods and drinks with no added sugar  Fruits and vegetables  At first, it may be helpful to use measuring cups and spoons to make sure you’re really eating the amount of food that’s in your plan. You can also use standardized portion sizes on food labels, such as 1 serving of meat being the size of a deck of cards. By checking your blood glucose 1 to 2 hours after eating, you can learn how your food choicesaffect your blood glucose.   To  create a diabetes meal plan or change a plan that’s not working for you, see a dietitian or diabetes educator. Let them know if you have any new health concerns or if your medicines have changed. Having ameal plan that you can live with will keep you at your healthy best.     © 7214-0759 [employer]. All rights reserved. This information is not intended as a substitute for professional medical care. Always follow yourhealthcare professional's instructions.    Diabetes: Caring for Your Body   When you have diabetes, your body needs special care. This care helps you stay healthy and prevent problems. Exercise and healthy eating are a part of this. You can also protect yourself by taking special care of your feet, skin, teeth,and eyes.   Caring for your feet     Follow these tips to help keep your feet healthy:  Check your feet every day for redness, blisters, cracks, dry skin, or numbness. Use a mirror to check the bottoms of your feet, if needed. Or ask for help.  Wash your feet in warm (not hot) water. Don’t soak them.  Use an emery board to keep your toenails even with the ends of your toes. File away sharp edges. A foot doctor (podiatrist) may need to cut your toenails for you.  Smooth down calluses gently or wait until your next podiatry appointment.  Keep your skin soft and smooth by putting a thin layer of skin lotion on the tops and bottoms of your feet. Don't put lotion in between your toes.  Always wear shoes or slippers, even inside your home. Make sure that shoes are correctly fitted, not too tight and not too loose. This can cause friction and rubbing of your feet. Change your socks daily. Always check shoes for foreign objects before putting them on.  Call your healthcare provider right away if your feet are numb or painful. Also call your provider if a cut or sore doesn’t heal in a few days.  Preventing skin infections   To prevent skin infections, bathe every day, but use a moderate water  temperature.. Dry yourself well, especially between your toes. Try to keep your home on the humid side during the colder months of the year to prevent your skin getting dry. Wash any cuts with warm, soapy water. Cover with a sterile bandage. Call your healthcare provider if a cut or sore doesn't heal in a fewdays, feels warm, itches, is swollen, or has a bad smell.   Caring for your teeth   Follow these guidelines for healthy teeth:  Brush your teeth twice daily.  Floss your teeth daily.  See your dentist at least twice yearly.  Keep your blood sugar in a good range.  Caring for your eyes  Have your eyes checked every year by an optometrist or ophthalmologist. Letyour healthcare provider know if you experience any of the following symptoms:   Blurred vision  Dark spots or \"holes\"  Flashes of light  Seeing more floaters than usual  Poor night vision  If you smoke, quit  Smoking is dangerous for everyone, especially people with diabetes. It can harm the blood vessels in your eyes, kidneys, nerves, and heart. It raises blood pressure. Smoking can also slow healing, so infections are more likely. Askyour healthcare provider about programs to help you stop smoking.   [employer] last reviewed this educational content on12/1/2021 © 2000-2022 [employer]. All rights reserved. This information is not intended as a substitute for professional medical care. Always follow yourhealthcare professional's instructions.

## 2024-11-13 NOTE — PROGRESS NOTES
[facility] Office Note  Chief Complaint:   Chief Complaint   Patient presents with    [facility]    Diabetes    Hypertension       HPI:   This is a 64 year old female coming in to [facility]. She has a history of diabetes, hypertension, hyperlipidemia. Her DM is diet controlled; she follows with DM clinic. HTN is controlled on metoprolol, irbesartan-hydrochlorothiazide. She has history of anxiety that is controlled on alprazolam. Denies any acute concerns today.     Family history of heart disease - has had cardiac screening in the past but did not bring in records.     She has h/o ovarian cancer s/p hysterectomy. She follows w/ gynecology annually.     Past Medical History:    Cancer (HCC)    Ovarian    Pre-diabetes     Past Surgical History:   Procedure Laterality Date    Hysterectomy  2001     Social History:  Social History     Socioeconomic History    Marital status:    Tobacco Use    Smoking status: Never    Smokeless tobacco: Never   Vaping Use    Vaping status: Never Used   Substance and Sexual Activity    Alcohol use: Not Currently    Drug use: Never     Family History:  History reviewed. No pertinent family history.  Allergies:  Allergies[1]  Current Meds:  Current Outpatient Medications   Medication Sig Dispense Refill    TURMERIC OR Take by mouth As Directed.      benzonatate 200 MG Oral Cap Take 1 capsule 3 times a day by oral route as needed for 7 days.      cyanocobalamin 100 MCG Oral Tab Take 0.5 tablets (50 mcg total) by mouth daily.      zinc sulfate 220 (50 Zn) MG Oral Cap Take 1 capsule (220 mg total) by mouth daily.      Calcium-Vitamin D-Vitamin K (CALCIUM SOFT CHEWS OR) Take by mouth.      cholecalciferol 50 MCG (2000 UT) Oral Cap Take 1 capsule (2,000 Units total) by mouth daily.      Menaquinone-7 (VITAMIN K2 OR) Take 100 Units by mouth.      FREESTYLE EULA 3 SENSOR Does not apply Misc USE AS DIRECTED AND CHANGE EVERY 14 DAYS 2 each 1     Irbesartan-hydroCHLOROthiazide 150-12.5 MG Oral Tab Take 1 tablet by mouth daily.      ALPRAZolam ER 0.5 MG Oral Tablet 24 Hr Take 1 tablet (0.5 mg total) by mouth every morning.      metoprolol succinate ER 50 MG Oral Tablet 24 Hr Take 1 tablet (50 mg total) by mouth daily.      Multiple Vitamins-Minerals (CENTRUM SILVER 50+WOMEN OR) Take by mouth.      Omega-3 Fatty Acids (FISH OIL) 500 MG Oral Cap Take by mouth.      Vitamin C 500 MG Oral Tab Take 1 tablet (500 mg total) by mouth daily. (Patient not taking: Reported on 11/13/2024)        Counseling given: Not Answered       REVIEW OF SYSTEMS:   Review of Systems   Constitutional: Negative.    HENT: Negative.     Eyes: Negative.    Respiratory: Negative.     Cardiovascular: Negative.    Gastrointestinal: Negative.    Endocrine: Negative.    Genitourinary: Negative.    Musculoskeletal: Negative.    Skin: Negative.    Neurological: Negative.    Psychiatric/Behavioral: Negative.          EXAM:   /68   Pulse 67   Resp 20   Ht 5' 6\" (1.676 m)   Wt 110 lb (49.9 kg)   SpO2 98%   BMI 17.75 kg/m²  Estimated body mass index is 17.75 kg/m² as calculated from the following:    Height as of this encounter: 5' 6\" (1.676 m).    Weight as of this encounter: 110 lb (49.9 kg).   Vital signs reviewed.Appears stated age, well groomed.  Physical Exam  Vitals and nursing note reviewed.   Constitutional:       Appearance: Normal appearance.   HENT:      Head: Normocephalic and atraumatic.   Eyes:      Pupils: Pupils are equal, round, and reactive to light.   Cardiovascular:      Rate and Rhythm: Normal rate and regular rhythm.      Pulses: Normal pulses.      Heart sounds: Normal heart sounds. No murmur heard.  Pulmonary:      Effort: Pulmonary effort is normal. No respiratory distress.      Breath sounds: Normal breath sounds. No wheezing or rhonchi.   Abdominal:      General: Abdomen is flat. Bowel sounds are normal. There is no distension.      Palpations: Abdomen is  soft. There is no mass.      Tenderness: There is no abdominal tenderness.      Hernia: No hernia is present.   Musculoskeletal:      Right lower leg: No edema.      Left lower leg: No edema.   Skin:     Findings: No rash.   Neurological:      General: No focal deficit present.      Mental Status: She is alert and oriented to person, place, and time.          ASSESSMENT AND PLAN:   1. Type 2 diabetes mellitus with microalbuminuria, without long-term current use of insulin (HCC)  Excellent control, Last A1c value was 6.3% done 8/8/2024.  CPM.  Goal: A1c < 7   Medications: none  Diet & exercise: diabetic diet, regular exericse.   Daily accucheks are appropriate  Hypoglycemic episodes?: none   Labs: UTD  CV: due for labs. Continue fish oil supplementation.   Renal: UTD  Eye: due   Foot: due at future OV  Immunizations: consider prevnar, shingrex   RTC in 6mo for T2DM follow up.       2. Primary hypertension  Stable, CPM. Due for labs. Continue low sodium diet, regular exercise, monitor BP at home goal <140/90. F/u 6mo    - metoprolol succinate ER 50 MG Oral Tablet 24 Hr; Take 1 tablet (50 mg total) by mouth daily.  Dispense: 90 tablet; Refill: 1  - Irbesartan-hydroCHLOROthiazide 150-12.5 MG Oral Tab; Take 0.5 tablets by mouth daily.  Dispense: 45 tablet; Refill: 1    3. Mixed hyperlipidemia  Due for labs. Continue fish oil, low fat diet, regular exercise.     4. Screen for colon cancer  UTD    5. Screening mammogram for breast cancer  - [facility] ИРИНА 2D+3D SCREENING BILAT (CPT=77067/78470); Future    6. Postmenopausal state  UTD. Osteopenia - counseled on calcium/vitamin D supplementation. Advise on regular weight bearing exercise.     7. Laboratory exam ordered as part of routine general medical examination  - CBC With Differential With Platelet; Future  - Comp Metabolic Panel (14); Future  - Lipid Panel; Future  - TSH W Reflex To Free T4; Future    8. Anxiety  Stable, CPM  - ALPRAZolam ER 0.5 MG Oral Tablet 24 Hr; Take 1  tablet (0.5 mg total) by mouth at bedtime.  Dispense: 30 tablet; Refill: 5    9. History of ovarian cancer  S/p total hysterectomy      Meds & Refills for this Visit:  Requested Prescriptions     Pending Prescriptions Disp Refills    metoprolol succinate ER 50 MG Oral Tablet 24 Hr 90 tablet 1     Sig: Take 1 tablet (50 mg total) by mouth daily.    ALPRAZolam ER 0.5 MG Oral Tablet 24 Hr  0     Sig: Take 1 tablet (0.5 mg total) by mouth every morning.       Health Maintenance:  Health Maintenance Due   Topic Date Due    Annual Physical  Never done    Mammogram  Never done    Pneumococcal Vaccine: 65+ Years (1 of 2 - PCV) Never done    Zoster Vaccines (1 of 2) Never done    Colorectal Cancer Screening  05/02/2012    DEXA Scan  Never done    Fall Risk Screening (Annual)  Never done    Diabetes Care Dilated Eye Exam  06/01/2024    Diabetes Care Foot Exam  08/14/2024    COVID-19 Vaccine (1 - 2024-25 season) Never done    Influenza Vaccine (1) Never done       Patient/Caregiver Education: Patient/Caregiver Education: There are no barriers to learning. Medical education done.   Outcome: Patient verbalizes understanding. Patient is notified to call with any questions, complications, allergies, or worsening or changing symptoms.  Patient is to call with any side effects or complications from the treatments as a result of today.     Problem List:  Patient Active Problem List   Diagnosis    Type 2 diabetes mellitus with microalbuminuria, without long-term current use of insulin (HCC)    Primary hypertension    Mixed hyperlipidemia    Hyperhidrosis          [1]   Allergies  Allergen Reactions    Levofloxacin PALPITATIONS    Penicillins UNKNOWN    Venlafaxine OTHER (SEE COMMENTS)

## 2024-11-15 ENCOUNTER — TELEPHONE (OUTPATIENT)
Dept: FAMILY MEDICINE CLINIC | Facility: CLINIC | Age: 64
End: 2024-11-15

## 2024-11-19 ENCOUNTER — MED REC SCAN ONLY (OUTPATIENT)
Dept: FAMILY MEDICINE CLINIC | Facility: CLINIC | Age: 64
End: 2024-11-19

## 2025-03-07 ENCOUNTER — NURSE TRIAGE (OUTPATIENT)
Dept: FAMILY MEDICINE CLINIC | Facility: CLINIC | Age: 65
End: 2025-03-07

## 2025-03-07 NOTE — TELEPHONE ENCOUNTER
Action Requested: Summary for Provider     []  Critical Lab, Recommendations Needed  [] Need Additional Advice  []   FYI    []   Need Orders  [] Need Medications Sent to Pharmacy  []  Other     SUMMARY: Patient called stating that she has had urinary frequency, foul odor urine for 4-5 days. Denies burning with urine, fever, or low back pain. Recommendation to be seen today, no appointments available. Informed patient since symptoms have been 4-5 days to go to urgent care. Patient refuses, states will wait to be seen in office. Offered multiple appointments but patient frustrated that she can not get in after 4 pm. Educated patient that urgent cares are open till evening times. Patient continues to refuse UC. Appointment made for 3/11/25. ER warnings given.     Reason for call: Urinary Symptoms  Onset: 3/2/25                       Reason for Disposition   Bad or foul-smelling urine    Protocols used: Urinary Symptoms-A-OH

## 2025-03-11 ENCOUNTER — OFFICE VISIT (OUTPATIENT)
Dept: FAMILY MEDICINE CLINIC | Facility: CLINIC | Age: 65
End: 2025-03-11
Payer: COMMERCIAL

## 2025-03-11 VITALS
WEIGHT: 110 LBS | RESPIRATION RATE: 16 BRPM | SYSTOLIC BLOOD PRESSURE: 138 MMHG | HEART RATE: 70 BPM | OXYGEN SATURATION: 98 % | HEIGHT: 66 IN | BODY MASS INDEX: 17.68 KG/M2 | DIASTOLIC BLOOD PRESSURE: 70 MMHG

## 2025-03-11 DIAGNOSIS — E11.29 TYPE 2 DIABETES MELLITUS WITH MICROALBUMINURIA, WITHOUT LONG-TERM CURRENT USE OF INSULIN (HCC): ICD-10-CM

## 2025-03-11 DIAGNOSIS — R80.9 TYPE 2 DIABETES MELLITUS WITH MICROALBUMINURIA, WITHOUT LONG-TERM CURRENT USE OF INSULIN (HCC): ICD-10-CM

## 2025-03-11 DIAGNOSIS — R39.9 UTI SYMPTOMS: Primary | ICD-10-CM

## 2025-03-11 LAB
APPEARANCE: CLEAR
BILIRUBIN: NEGATIVE
CREAT UR-SCNC: 84.6 MG/DL
GLUCOSE (URINE DIPSTICK): NEGATIVE MG/DL
KETONES (URINE DIPSTICK): NEGATIVE MG/DL
LEUKOCYTES: NEGATIVE
MICROALBUMIN UR-MCNC: 4.1 MG/DL
MICROALBUMIN/CREAT 24H UR-RTO: 48.5 UG/MG (ref ?–30)
MULTISTIX LOT#: NORMAL NUMERIC
NITRITE, URINE: NEGATIVE
OCCULT BLOOD: NEGATIVE
PH, URINE: 5.5 (ref 4.5–8)
PROTEIN (URINE DIPSTICK): NEGATIVE MG/DL
SPECIFIC GRAVITY: 1.03 (ref 1–1.03)
URINE-COLOR: YELLOW
UROBILINOGEN,SEMI-QN: 0.2 MG/DL (ref 0–1.9)

## 2025-03-11 PROCEDURE — 3008F BODY MASS INDEX DOCD: CPT

## 2025-03-11 PROCEDURE — 3075F SYST BP GE 130 - 139MM HG: CPT

## 2025-03-11 PROCEDURE — 81003 URINALYSIS AUTO W/O SCOPE: CPT

## 2025-03-11 PROCEDURE — 82570 ASSAY OF URINE CREATININE: CPT

## 2025-03-11 PROCEDURE — 87086 URINE CULTURE/COLONY COUNT: CPT

## 2025-03-11 PROCEDURE — 82043 UR ALBUMIN QUANTITATIVE: CPT

## 2025-03-11 PROCEDURE — 3078F DIAST BP <80 MM HG: CPT

## 2025-03-11 PROCEDURE — 99213 OFFICE O/P EST LOW 20 MIN: CPT

## 2025-03-11 RX ORDER — NITROFURANTOIN 25; 75 MG/1; MG/1
100 CAPSULE ORAL EVERY 12 HOURS
COMMUNITY
Start: 2025-03-07

## 2025-03-11 NOTE — PROGRESS NOTES
[facility] Family Medicine Office Note  Chief Complaint:   Chief Complaint   Patient presents with    Urinary     foul odor urine for 4-5 days. Denies burning with urine, fever, or low back pain, pt was seen at a [facility] on 3/7/25 was given nitrofurantoin, states not getting any better       HPI:   Carolina Humphreys is a 64 year old female coming in for foul odor when urinating. Has been occurring for last 4-5 days prior to 3/7/25.  Went to walk a [facility] clinic on 3/7/25 where she was prescribed nitrofurantoin antibiotic. However, she states her symptoms are not getting better. Having slight burning with urination. Denies fever or low back pain.  Any change in frequency of urination?: increased, feels like she has go a lot, but not a lot comes out. Having some lower pelvic pressure  Blood in urine? no, denies any cloudiness  Back pain?: no  Vaginal Discharge?: no  Change in partners?: no     Reports a weakened stream too    Urine culture from [facility] clinic came back and said no growth in 1 day  Has 1 more day of nitrofurantoin to take      Past Medical History:    Cancer (HCC)    Ovarian    Pre-diabetes     Past Surgical History:   Procedure Laterality Date    Hysterectomy  2001     Social History:  Social History     Socioeconomic History    Marital status:    Tobacco Use    Smoking status: Never    Smokeless tobacco: Never   Vaping Use    Vaping status: Never Used   Substance and Sexual Activity    Alcohol use: Not Currently    Drug use: Never     Family History:  History reviewed. No pertinent family history.  Allergies:  Allergies   Allergen Reactions    Levofloxacin PALPITATIONS    Penicillins UNKNOWN    Venlafaxine OTHER (SEE COMMENTS)     Current Meds:  Current Outpatient Medications   Medication Sig Dispense Refill    nitrofurantoin monohydrate macro 100 MG Oral Cap Take 1 capsule (100 mg total) by mouth Q12H.      TURMERIC OR Take by mouth As Directed.      cyanocobalamin  100 MCG Oral Tab Take 0.5 tablets (50 mcg total) by mouth daily.      zinc sulfate 220 (50 Zn) MG Oral Cap Take 1 capsule (220 mg total) by mouth daily.      Calcium-Vitamin D-Vitamin K (CALCIUM SOFT CHEWS OR) Take by mouth.      cholecalciferol 50 MCG (2000 UT) Oral Cap Take 1 capsule (2,000 Units total) by mouth daily.      Menaquinone-7 (VITAMIN K2 OR) Take 100 Units by mouth.      metoprolol succinate ER 50 MG Oral Tablet 24 Hr Take 1 tablet (50 mg total) by mouth daily. 90 tablet 1    ALPRAZolam ER 0.5 MG Oral Tablet 24 Hr Take 1 tablet (0.5 mg total) by mouth at bedtime. 30 tablet 5    Irbesartan-hydroCHLOROthiazide 150-12.5 MG Oral Tab Take 0.5 tablets by mouth daily. 45 tablet 1    FREESTYLE EULA 3 SENSOR Does not apply Misc USE AS DIRECTED AND CHANGE EVERY 14 DAYS 2 each 1    Multiple Vitamins-Minerals (CENTRUM SILVER 50+WOMEN OR) Take by mouth.      Omega-3 Fatty Acids (FISH OIL) 500 MG Oral Cap Take by mouth.        Counseling given: Not Answered       REVIEW OF SYSTEMS:   Review of Systems   Constitutional:  Negative for chills, diaphoresis, fatigue and fever.   Respiratory:  Negative for cough and shortness of breath.    Cardiovascular:  Negative for chest pain and palpitations.   Genitourinary:  Positive for dysuria and frequency. Negative for decreased urine volume, difficulty urinating, flank pain, hematuria, menstrual problem, pelvic pain (pelvic pressure, no pelvic pain) and urgency.   Musculoskeletal:  Negative for back pain.   Skin:  Negative for color change and rash.   Neurological:  Negative for dizziness, weakness, light-headedness and headaches.        EXAM:   /70   Pulse 70   Resp 16   Ht 5' 6\" (1.676 m)   Wt 110 lb (49.9 kg)   SpO2 98%   BMI 17.75 kg/m²  Estimated body mass index is 17.75 kg/m² as calculated from the following:    Height as of this encounter: 5' 6\" (1.676 m).    Weight as of this encounter: 110 lb (49.9 kg).   Vital signs reviewed.Appears stated age, well  groomed.  Physical Exam  Constitutional:       Appearance: Normal appearance.   HENT:      Head: Normocephalic and atraumatic.   Eyes:      Extraocular Movements: Extraocular movements intact.      Conjunctiva/sclera: Conjunctivae normal.      Pupils: Pupils are equal, round, and reactive to light.   Cardiovascular:      Rate and Rhythm: Normal rate and regular rhythm.   Pulmonary:      Effort: Pulmonary effort is normal. No respiratory distress.      Breath sounds: Normal breath sounds. No stridor. No wheezing, rhonchi or rales.   Chest:      Chest wall: No tenderness.   Abdominal:      General: Abdomen is flat. Bowel sounds are normal. There is no distension.      Palpations: Abdomen is soft. There is no mass.      Tenderness: There is no abdominal tenderness. There is no right CVA tenderness, left CVA tenderness, guarding or rebound.      Hernia: No hernia is present.   Genitourinary:     Comments: Mild pelvic tenderness when skin bellow umbilicus is palpated  Skin:     General: Skin is warm and dry.   Neurological:      Mental Status: She is alert and oriented to person, place, and time.   Psychiatric:         Mood and Affect: Mood normal.         Behavior: Behavior normal.         Thought Content: Thought content normal.         Judgment: Judgment normal.        Recent Results (from the past 72 hours)   URINALYSIS, AUTO, W/O SCOPE    Collection Time: 03/11/25  4:23 PM   Result Value Ref Range    Glucose Urine Negative Negative mg/dL    Bilirubin Urine Negative Negative    Ketones, UA Negative Negative - Trace mg/dL    Spec Gravity 1.030 1.005 - 1.030    Blood Urine Negative Negative    PH Urine 5.5 5.0 - 8.0    Protein Urine Negative Negative - Trace mg/dL    Urobilinogen Urine 0.2 0.2 - 1.0 mg/dL    Nitrite Urine Negative Negative    Leukocyte Esterase Urine Negative Negative    APPEARANCE clear Clear    Color Urine yellow Yellow    Multistix Lot# [id number] Numeric    Multistix Expiration Date 7/31/25 Date          ASSESSMENT AND PLAN:   1. UTI symptoms  UA performed in clinic today shows no signs of an ongoing urinary tract infection given the it was negative for nitrates and leukocytes.  Blood in urine was also negative.  Will send urine out to be cultured and if positive will start antibiotic.  Advised to finish the nitrofurantoin antibiotic that was originally prescribed by the [facility] clinic for now.  Also advised to increase fluid intake since dehydration could be causing possible UTI symptoms since today's UA and the previous urine culture were both negative.  There was no evidence of pyelonephritis, nephrolithiasis, traumatic injury based on today's physical exam.  - Urine Culture, Routine; Future  - URINALYSIS, AUTO, W/O SCOPE  - Urine Culture, Routine    2. Type 2 diabetes mellitus with microalbuminuria, without long-term current use of insulin (HCC)  Due for labs.  Patient declined A1c lab because she wants to wait until April 2025 to complete it.  Will order microalbumin creatinine ratio since she does have history of increased microalbumin creatinine ratio based on chart review.  Her UTI symptoms could possibly be caused from her diabetes if there has been a progression.  - Microalb/Creat Ratio, Random Urine [E]; Future  - Microalb/Creat Ratio, Random Urine [E]      Meds & Refills for this Visit:  Requested Prescriptions      No prescriptions requested or ordered in this encounter       Health Maintenance:    Patient/Caregiver Education: Patient/Caregiver Education: There are no barriers to learning. Medical education done.   Outcome: Carolina Humphreys verbalizes understanding. Carolina Humphreys  is notified to call with any questions, complications, allergies, or worsening or changing symptoms.  Carolina Humphreys  is to call with any side effects or complications from the treatments as a result of today.     Problem List:  Patient Active Problem List   Diagnosis    Type 2 diabetes mellitus with  microalbuminuria, without long-term current use of insulin (HCC)    Primary hypertension    Mixed hyperlipidemia    Hyperhidrosis    History of ovarian cancer       Return if symptoms worsen or fail to improve.

## 2025-03-19 ENCOUNTER — TELEPHONE (OUTPATIENT)
Dept: FAMILY MEDICINE CLINIC | Facility: CLINIC | Age: 65
End: 2025-03-19

## 2025-03-19 DIAGNOSIS — R10.2 PELVIC PRESSURE IN FEMALE: Primary | ICD-10-CM

## 2025-03-19 NOTE — TELEPHONE ENCOUNTER
See 3/11/25 lab results. Pt states she spoke to nurse today about lab results. Pt states she has increased her hydration/fluids. As the day goes on, she is still experiencing pelvic pressure and would like the US of bladder and kidneys.

## 2025-03-20 NOTE — TELEPHONE ENCOUNTER
Called and spoke to pt. Informed her US order placed, can hold on urology referral until we get US results. Patient stated understanding and agreed to plan, central scheduling number provided.

## 2025-03-20 NOTE — TELEPHONE ENCOUNTER
US bladder/kidney ordered. Can wait on urology referral until we get results of US.    1. Pelvic pressure in female  - US KIDNEY/BLADDER (CPT=76770); Future

## 2025-04-04 ENCOUNTER — TELEPHONE (OUTPATIENT)
Dept: FAMILY MEDICINE CLINIC | Facility: CLINIC | Age: 65
End: 2025-04-04

## 2025-04-04 DIAGNOSIS — R80.9 TYPE 2 DIABETES MELLITUS WITH MICROALBUMINURIA, WITHOUT LONG-TERM CURRENT USE OF INSULIN (HCC): Primary | ICD-10-CM

## 2025-04-04 DIAGNOSIS — E11.29 TYPE 2 DIABETES MELLITUS WITH MICROALBUMINURIA, WITHOUT LONG-TERM CURRENT USE OF INSULIN (HCC): Primary | ICD-10-CM

## 2025-04-04 NOTE — TELEPHONE ENCOUNTER
DR Martinez placed lab orders in back on 11/13/24 will add A1C.   Spoke to patient with directions and fasting instructions as well

## 2025-04-04 NOTE — TELEPHONE ENCOUNTER
Patient requesting A1C lab ordered. She will be doing her labs at the beginning of May for her appointment 5/16/25

## 2025-04-09 ENCOUNTER — HOSPITAL ENCOUNTER (OUTPATIENT)
Dept: ULTRASOUND IMAGING | Age: 65
Discharge: HOME | End: 2025-04-09
Payer: COMMERCIAL

## 2025-04-09 ENCOUNTER — TELEPHONE (OUTPATIENT)
Dept: FAMILY MEDICINE CLINIC | Facility: CLINIC | Age: 65
End: 2025-04-09

## 2025-04-09 DIAGNOSIS — R10.2 PELVIC PRESSURE IN FEMALE: ICD-10-CM

## 2025-04-09 PROCEDURE — 76770 US EXAM ABDO BACK WALL COMP: CPT

## 2025-04-09 NOTE — TELEPHONE ENCOUNTER
Call patient and inform her that I received her ultrasound of her kidney/bladder and lab reports stated that she had bilateral nonobstructing renal calculi, no hydronephrosis, and elevated postvoid residue.  Informed her that she had a 6 mm nonobstructing calculus in the lower pole of the right kidney and a 5 mm nonobstructing calculus in the lower pole of the left kidney.    She informed me on the call that she is no longer having any abdominal pain or pressure or pelvic pain.  Reports that she is unsure if she had any changes in urinary frequency urgency compared to when I saw her in March.  Denies any pain with urination.  Denies any CVA pain or back pain.    I advised her that the kidney stones both are small enough to possibly be passed on stone, but we can try a trial of tamsulosin medication to help the stones pass more easily.  She declined the medication and would rather increase her hydration throughout the stones pass since she is not currently having any symptoms.    I advised her to follow-up if symptoms restart again and to monitor her urination to see if the stones to pass.    She agreed with the plan and no other questions at this time.

## 2025-04-11 NOTE — TELEPHONE ENCOUNTER
Patient called with many questions, most of them answered by this Triage RN. Patient states that she has intermittent burning, pressure and frequency when urinating. Wants to know if she starts tamsulosin when will it start working and how long does she need to take it. Also wants to know with her history of a prolapsed uterus will this decrease the urine flow/kidney stone passage.      Tonio- patient states she has intermittent burning, pressure and frequency when urinating. Her questions:    If she starts taking Tamsulosin, when will it start to work? How long does she need to take it?  Will a prolapse uterus decrease flow of urine and kidney stone?

## 2025-04-11 NOTE — TELEPHONE ENCOUNTER
Called Carolina to discuss her symptoms.  Informed her that her symptoms do sound like a possible UTI.  It could be possibly coming from the kidney stone itself or new onset.  Informed her that I do of opening at 1030 tomorrow morning where we can do a urine analysis and see if the actual UTI is occurring.  She agreed for the appointment tomorrow where we can do a UA and further assess her symptoms in person.    Informed her that I do not believe a prolapsed uterus could be causing decrease in the flow of urine and the kidney stone.  Informed to stay hydrated drink plenty water, and we can assess her symptoms tomorrow and see if tamsulosin still needed.

## 2025-04-12 ENCOUNTER — OFFICE VISIT (OUTPATIENT)
Dept: FAMILY MEDICINE CLINIC | Facility: CLINIC | Age: 65
End: 2025-04-12
Payer: COMMERCIAL

## 2025-04-12 VITALS
SYSTOLIC BLOOD PRESSURE: 140 MMHG | BODY MASS INDEX: 17.68 KG/M2 | WEIGHT: 110 LBS | DIASTOLIC BLOOD PRESSURE: 68 MMHG | HEART RATE: 56 BPM | HEIGHT: 66 IN | OXYGEN SATURATION: 98 % | RESPIRATION RATE: 16 BRPM

## 2025-04-12 DIAGNOSIS — N20.0 BILATERAL KIDNEY STONES: ICD-10-CM

## 2025-04-12 DIAGNOSIS — I10 PRIMARY HYPERTENSION: ICD-10-CM

## 2025-04-12 DIAGNOSIS — R39.9 UTI SYMPTOMS: Primary | ICD-10-CM

## 2025-04-12 LAB
APPEARANCE: CLEAR
BILIRUBIN: NEGATIVE
GLUCOSE (URINE DIPSTICK): NEGATIVE MG/DL
KETONES (URINE DIPSTICK): NEGATIVE MG/DL
LEUKOCYTES: NEGATIVE
MULTISTIX LOT#: NORMAL NUMERIC
NITRITE, URINE: NEGATIVE
OCCULT BLOOD: NEGATIVE
PH, URINE: 6.5 (ref 4.5–8)
PROTEIN (URINE DIPSTICK): NEGATIVE MG/DL
SPECIFIC GRAVITY: 1 (ref 1–1.03)
URINE-COLOR: YELLOW
UROBILINOGEN,SEMI-QN: 0.2 MG/DL (ref 0–1.9)

## 2025-04-12 PROCEDURE — 87086 URINE CULTURE/COLONY COUNT: CPT

## 2025-04-12 PROCEDURE — 99213 OFFICE O/P EST LOW 20 MIN: CPT

## 2025-04-12 PROCEDURE — 3078F DIAST BP <80 MM HG: CPT

## 2025-04-12 PROCEDURE — 3060F POS MICROALBUMINURIA REV: CPT

## 2025-04-12 PROCEDURE — 3061F NEG MICROALBUMINURIA REV: CPT

## 2025-04-12 PROCEDURE — 81003 URINALYSIS AUTO W/O SCOPE: CPT

## 2025-04-12 PROCEDURE — 3008F BODY MASS INDEX DOCD: CPT

## 2025-04-12 PROCEDURE — 3077F SYST BP >= 140 MM HG: CPT

## 2025-04-12 RX ORDER — TAMSULOSIN HYDROCHLORIDE 0.4 MG/1
0.4 CAPSULE ORAL DAILY
Qty: 30 CAPSULE | Refills: 0 | Status: SHIPPED | OUTPATIENT
Start: 2025-04-12 | End: 2025-05-12

## 2025-04-12 NOTE — PROGRESS NOTES
[facility] Family Medicine Office Note  Chief Complaint:   Chief Complaint   Patient presents with    UTI       HPI:   Carolina Humphreys is a 64 year old female coming in for pain when urinating.  She states the pain began yesterday 4/11/2025.  However, she informs me today that she is not having any symptoms at all currently.  No pain with urination, no abdominal or pelvic pressure/pain, no back pain, no urinary frequency urgency.  She states her symptoms went away when she woke up this morning    I originally saw her on 3/11/2025 for UTI symptoms that originally began 3/7/2025.  On 3/7/2025 she went to a quick care clinic where she was prescribed nitrofurantoin but it did not relieve her symptoms.  The urine culture came back around the [facility] care clinic that showed no growth of bacteria.  On 3/11/25, a UA was completed in our office that was negative.  As result the urine was sent out for culture and it was decided to wait on the culture results to see if antibiotic would be needed.  The urine culture ended up being negative so no antibiotic was prescribed.  Carolina Humphreys then contacted nurse triage on 3/19/25 stating that she was having some pelvic pressure and would like ultrasound of bladder/kidneys.    She completed the ultrasound kidney/bladder on 4/9/2025 that showed bilateral nonobstructing renal calculi, no hydronephrosis, and elevated postvoid residue.  The calculi were 6 mm nonobstructing calculus in the lower pole of the right kidney and a 5 mm nonobstructing calculus in the lower pole of the left kidney.  I called her on 4/9/2025 to discuss results and she informed me she was not having any abdominal pain, pressure or pelvic pain as well as was not sure if she had any urinary frequency urgency compared to when I saw her last in person.  She denied any dysuria and denied any flank pain or back pain.  We discussed that given the stones were this small enough they could be passed on their  own and she declined a trial of the tamsulosin    On 4/11/2025 she called nurse triage stating that she has intermittent burning, pressure, frequency when urinating and had questions if she were to start tamsulosin. I called her back and requested to see her in person today.    Past Medical History:    Cancer (HCC)    Ovarian    Pre-diabetes     Past Surgical History:   Procedure Laterality Date    Hysterectomy  2001     Social History:  Social History     Socioeconomic History    Marital status:    Tobacco Use    Smoking status: Never    Smokeless tobacco: Never   Vaping Use    Vaping status: Never Used   Substance and Sexual Activity    Alcohol use: Not Currently    Drug use: Never     Family History:  History reviewed. No pertinent family history.  Allergies:  Allergies   Allergen Reactions    Levofloxacin PALPITATIONS    Penicillins UNKNOWN    Venlafaxine OTHER (SEE COMMENTS)     Current Meds:  Current Medications[1]   Counseling given: Not Answered       REVIEW OF SYSTEMS:   Review of Systems   Constitutional:  Negative for chills, diaphoresis, fatigue and fever.   Respiratory:  Negative for cough and shortness of breath.    Cardiovascular:  Negative for chest pain and palpitations.   Gastrointestinal:  Negative for abdominal distention, abdominal pain, constipation, diarrhea and nausea.   Genitourinary:  Negative for decreased urine volume, difficulty urinating, dysuria, flank pain, frequency, menstrual problem, pelvic pain, urgency, vaginal bleeding, vaginal discharge and vaginal pain.   Musculoskeletal:  Negative for back pain.   Skin:  Negative for color change.   Neurological:  Negative for dizziness, weakness, light-headedness and headaches.        EXAM:   /68   Pulse 56   Resp 16   Ht 5' 6\" (1.676 m)   Wt 110 lb (49.9 kg)   SpO2 98%   BMI 17.75 kg/m²  Estimated body mass index is 17.75 kg/m² as calculated from the following:    Height as of this encounter: 5' 6\" (1.676 m).    Weight as  of this encounter: 110 lb (49.9 kg).   Vital signs reviewed.Appears stated age, well groomed.  Physical Exam  Constitutional:       General: She is not in acute distress.     Appearance: Normal appearance. She is not ill-appearing, toxic-appearing or diaphoretic.   Cardiovascular:      Rate and Rhythm: Regular rhythm. Bradycardia present.   Pulmonary:      Effort: Pulmonary effort is normal. No respiratory distress.      Breath sounds: Normal breath sounds. No stridor. No wheezing, rhonchi or rales.   Chest:      Chest wall: No tenderness.   Abdominal:      General: Abdomen is flat. Bowel sounds are normal. There is no distension.      Palpations: Abdomen is soft. There is no mass.      Tenderness: There is no abdominal tenderness. There is no right CVA tenderness, left CVA tenderness, guarding or rebound.      Hernia: No hernia is present.   Musculoskeletal:         General: No tenderness. Normal range of motion.   Skin:     General: Skin is warm.      Coloration: Skin is not jaundiced or pale.      Findings: No bruising, erythema or rash.   Neurological:      Mental Status: She is alert and oriented to person, place, and time.   Psychiatric:         Mood and Affect: Mood normal.         Behavior: Behavior normal.         Thought Content: Thought content normal.         Judgment: Judgment normal.          Recent Results (from the past 72 hours)   URINALYSIS, AUTO, W/O SCOPE    Collection Time: 04/12/25 10:58 AM   Result Value Ref Range    Glucose Urine Negative Negative mg/dL    Bilirubin Urine Negative Negative    Ketones, UA Negative Negative - Trace mg/dL    Spec Gravity 1.005 1.005 - 1.030    Blood Urine Negative Negative    PH Urine 6.5 5.0 - 8.0    Protein Urine Negative Negative - Trace mg/dL    Urobilinogen Urine 0.2 0.2 - 1.0 mg/dL    Nitrite Urine Negative Negative    Leukocyte Esterase Urine Negative Negative    APPEARANCE clear Clear    Color Urine yellow Yellow    Multistix Lot# [id number] Numeric     Multistix Expiration Date 12/31/25 Date          ASSESSMENT AND PLAN:   1. UTI symptoms  UA was negative in office today.  Will send urine out to be cultured.  Have low suspicion for UTI and think her symptoms that were present yesterday could be related to the kidney stones she currently has.  Urology referral provided for deeper workup.  Recommend using a filter when urinating to catch kidney stones  - Urine Culture, Routine; Future  - URINALYSIS, AUTO, W/O SCOPE  - Urology Referral - In Network    2. Bilateral kidney stones  Patient not reporting any pain from kidney stones currently.  I believe her burning with urination yesterday could possibly relate to the kidney stone passing.  Urology referral provided for deeper workup.  Tamsulosin prescribed passed kidney stone.  Advised patient she can try taking the tamsulosin for 1 to 3 days until she noticed the kidney stone.  - tamsulosin 0.4 MG Oral Cap; Take 1 capsule (0.4 mg total) by mouth daily. Take 30 minutes after same meal each day  Dispense: 30 capsule; Refill: 0  - Urology Referral - In Network    3. Primary hypertension  Blood pressure elevated at 140/68.  Patient stated that she forgot to take her blood pressure medication this morning but plans to do so when she gets home      Meds & Refills for this Visit:  Requested Prescriptions     Signed Prescriptions Disp Refills    tamsulosin 0.4 MG Oral Cap 30 capsule 0     Sig: Take 1 capsule (0.4 mg total) by mouth daily. Take 30 minutes after same meal each day       Health Maintenance:    Patient/Caregiver Education: Patient/Caregiver Education: There are no barriers to learning. Medical education done.   Outcome: Carolina Humphreys verbalizes understanding. Carolina Humphreys  is notified to call with any questions, complications, allergies, or worsening or changing symptoms.  Carolina Humphreys  is to call with any side effects or complications from the treatments as a result of today.     Problem  List:  Patient Active Problem List   Diagnosis    Type 2 diabetes mellitus with microalbuminuria, without long-term current use of insulin (HCC)    Primary hypertension    Mixed hyperlipidemia    Hyperhidrosis    History of ovarian cancer       Return if symptoms worsen or fail to improve.          [1]   Current Outpatient Medications   Medication Sig Dispense Refill    tamsulosin 0.4 MG Oral Cap Take 1 capsule (0.4 mg total) by mouth daily. Take 30 minutes after same meal each day 30 capsule 0    TURMERIC OR Take by mouth As Directed.      cyanocobalamin 100 MCG Oral Tab Take 0.5 tablets (50 mcg total) by mouth daily.      zinc sulfate 220 (50 Zn) MG Oral Cap Take 1 capsule (220 mg total) by mouth daily.      Calcium-Vitamin D-Vitamin K (CALCIUM SOFT CHEWS OR) Take by mouth.      cholecalciferol 50 MCG (2000 UT) Oral Cap Take 1 capsule (2,000 Units total) by mouth daily.      Menaquinone-7 (VITAMIN K2 OR) Take 100 Units by mouth.      metoprolol succinate ER 50 MG Oral Tablet 24 Hr Take 1 tablet (50 mg total) by mouth daily. 90 tablet 1    ALPRAZolam ER 0.5 MG Oral Tablet 24 Hr Take 1 tablet (0.5 mg total) by mouth at bedtime. 30 tablet 5    Irbesartan-hydroCHLOROthiazide 150-12.5 MG Oral Tab Take 0.5 tablets by mouth daily. 45 tablet 1    FREESTYLE EULA 3 SENSOR Does not apply Misc USE AS DIRECTED AND CHANGE EVERY 14 DAYS 2 each 1    Multiple Vitamins-Minerals (CENTRUM SILVER 50+WOMEN OR) Take by mouth.      Omega-3 Fatty Acids (FISH OIL) 500 MG Oral Cap Take by mouth.      nitrofurantoin monohydrate macro 100 MG Oral Cap Take 1 capsule (100 mg total) by mouth Q12H. (Patient not taking: Reported on 4/12/2025)

## 2025-04-25 ENCOUNTER — TELEPHONE (OUTPATIENT)
Dept: FAMILY MEDICINE CLINIC | Facility: CLINIC | Age: 65
End: 2025-04-25

## 2025-04-25 ENCOUNTER — TELEPHONE (OUTPATIENT)
Dept: INTERNAL MEDICINE CLINIC | Facility: CLINIC | Age: 65
End: 2025-04-25

## 2025-04-25 NOTE — TELEPHONE ENCOUNTER
Spoke to patient regarding Tonio ARMENTA recommendation. Patient verbalizes understanding. Patient states that she does not have any sudden flank/pelvic pain. Will call back if she has any other concerns.

## 2025-04-25 NOTE — TELEPHONE ENCOUNTER
Patient saw KATHI CASTRO On 04/12 regarding kidney stones and she was referred to see Urologist. She scheduled an apt with Dr. LONI hCristianson, soonest she could get in is 06/10. Pt wants to know if this date is ok she was told not to wait too long.

## 2025-04-25 NOTE — TELEPHONE ENCOUNTER
See prior 4/22/25 TE.  If she has developed any new onset flank pain or pelvic pain, can order CT kidney/bladder to revaluate kidney stones.    Ok to wait as long as she is not in any pain and she continues to stay hydrated

## 2025-04-25 NOTE — TELEPHONE ENCOUNTER
Probably not necessary to continue taking flomax if she is not experiencing the stones passing by now. Recommend continue to stay hydrated to help stones passed.     If she has developed any new onset flank pain or pelvic pain, can order CT kidney/bladder to revaluate kidney stones

## 2025-04-25 NOTE — TELEPHONE ENCOUNTER
Patient called to state that she took Flomax for 3 days. States the kidney stones still have not passed. Asking if she should continue to take Flomax?    Tonio - patient asking if she should continue taking Flomax?          4/12/25  2. Bilateral kidney stones  Patient not reporting any pain from kidney stones currently.  I believe her burning with urination yesterday could possibly relate to the kidney stone passing.  Urology referral provided for deeper workup.  Tamsulosin prescribed passed kidney stone.  Advised patient she can try taking the tamsulosin for 1 to 3 days until she noticed the kidney stone.  - tamsulosin 0.4 MG Oral Cap; Take 1 capsule (0.4 mg total) by mouth daily. Take 30 minutes after same meal each day  Dispense: 30 capsule; Refill: 0  - Urology Referral - In Network

## 2025-04-28 NOTE — TELEPHONE ENCOUNTER
Spoke to pt, she has not developed any new onset flank pain or pelvic pain. Aware Tonio can order a CT if she does. Will wait to see urologist in June.

## 2025-05-09 ENCOUNTER — LAB ENCOUNTER (OUTPATIENT)
Dept: LAB | Age: 65
End: 2025-05-09
Attending: FAMILY MEDICINE
Payer: COMMERCIAL

## 2025-05-09 DIAGNOSIS — Z00.00 LABORATORY EXAM ORDERED AS PART OF ROUTINE GENERAL MEDICAL EXAMINATION: ICD-10-CM

## 2025-05-09 DIAGNOSIS — R80.9 TYPE 2 DIABETES MELLITUS WITH MICROALBUMINURIA, WITHOUT LONG-TERM CURRENT USE OF INSULIN (HCC): ICD-10-CM

## 2025-05-09 DIAGNOSIS — E11.29 TYPE 2 DIABETES MELLITUS WITH MICROALBUMINURIA, WITHOUT LONG-TERM CURRENT USE OF INSULIN (HCC): ICD-10-CM

## 2025-05-09 LAB
ALBUMIN SERPL-MCNC: 4.7 G/DL (ref 3.2–4.8)
ALBUMIN/GLOB SERPL: 2.4 {RATIO} (ref 1–2)
ALP LIVER SERPL-CCNC: 41 U/L (ref 55–142)
ALT SERPL-CCNC: 24 U/L (ref 10–49)
ANION GAP SERPL CALC-SCNC: 8 MMOL/L (ref 0–18)
AST SERPL-CCNC: 20 U/L (ref ?–34)
BASOPHILS # BLD AUTO: 0.06 X10(3) UL (ref 0–0.2)
BASOPHILS NFR BLD AUTO: 0.9 %
BILIRUB SERPL-MCNC: 1.2 MG/DL (ref 0.2–1.1)
BUN BLD-MCNC: 17 MG/DL (ref 9–23)
CALCIUM BLD-MCNC: 9.3 MG/DL (ref 8.7–10.6)
CHLORIDE SERPL-SCNC: 106 MMOL/L (ref 98–112)
CHOLEST SERPL-MCNC: 190 MG/DL (ref ?–200)
CO2 SERPL-SCNC: 29 MMOL/L (ref 21–32)
CREAT BLD-MCNC: 0.69 MG/DL (ref 0.55–1.02)
EGFRCR SERPLBLD CKD-EPI 2021: 96 ML/MIN/1.73M2 (ref 60–?)
EOSINOPHIL # BLD AUTO: 0.09 X10(3) UL (ref 0–0.7)
EOSINOPHIL NFR BLD AUTO: 1.4 %
ERYTHROCYTE [DISTWIDTH] IN BLOOD BY AUTOMATED COUNT: 13 %
EST. AVERAGE GLUCOSE BLD GHB EST-MCNC: 126 MG/DL (ref 68–126)
FASTING PATIENT LIPID ANSWER: YES
FASTING STATUS PATIENT QL REPORTED: YES
GLOBULIN PLAS-MCNC: 2 G/DL (ref 2–3.5)
GLUCOSE BLD-MCNC: 123 MG/DL (ref 70–99)
HBA1C MFR BLD: 6 % (ref ?–5.7)
HCT VFR BLD AUTO: 46 % (ref 35–48)
HDLC SERPL-MCNC: 51 MG/DL (ref 40–59)
HGB BLD-MCNC: 14.9 G/DL (ref 12–16)
IMM GRANULOCYTES # BLD AUTO: 0.01 X10(3) UL (ref 0–1)
IMM GRANULOCYTES NFR BLD: 0.2 %
LDLC SERPL CALC-MCNC: 127 MG/DL (ref ?–100)
LYMPHOCYTES # BLD AUTO: 2.23 X10(3) UL (ref 1–4)
LYMPHOCYTES NFR BLD AUTO: 34.1 %
MCH RBC QN AUTO: 30 PG (ref 26–34)
MCHC RBC AUTO-ENTMCNC: 32.4 G/DL (ref 31–37)
MCV RBC AUTO: 92.7 FL (ref 80–100)
MONOCYTES # BLD AUTO: 0.52 X10(3) UL (ref 0.1–1)
MONOCYTES NFR BLD AUTO: 8 %
NEUTROPHILS # BLD AUTO: 3.63 X10 (3) UL (ref 1.5–7.7)
NEUTROPHILS # BLD AUTO: 3.63 X10(3) UL (ref 1.5–7.7)
NEUTROPHILS NFR BLD AUTO: 55.4 %
NONHDLC SERPL-MCNC: 139 MG/DL (ref ?–130)
OSMOLALITY SERPL CALC.SUM OF ELEC: 299 MOSM/KG (ref 275–295)
PLATELET # BLD AUTO: 252 10(3)UL (ref 150–450)
POTASSIUM SERPL-SCNC: 3.7 MMOL/L (ref 3.5–5.1)
PROT SERPL-MCNC: 6.7 G/DL (ref 5.7–8.2)
RBC # BLD AUTO: 4.96 X10(6)UL (ref 3.8–5.3)
SODIUM SERPL-SCNC: 143 MMOL/L (ref 136–145)
TRIGL SERPL-MCNC: 63 MG/DL (ref 30–149)
TSI SER-ACNC: 0.75 UIU/ML (ref 0.55–4.78)
VLDLC SERPL CALC-MCNC: 11 MG/DL (ref 0–30)
WBC # BLD AUTO: 6.5 X10(3) UL (ref 4–11)

## 2025-05-09 PROCEDURE — 85025 COMPLETE CBC W/AUTO DIFF WBC: CPT

## 2025-05-09 PROCEDURE — 80053 COMPREHEN METABOLIC PANEL: CPT

## 2025-05-09 PROCEDURE — 84443 ASSAY THYROID STIM HORMONE: CPT

## 2025-05-09 PROCEDURE — 80061 LIPID PANEL: CPT

## 2025-05-09 PROCEDURE — 83036 HEMOGLOBIN GLYCOSYLATED A1C: CPT

## 2025-05-09 PROCEDURE — 36415 COLL VENOUS BLD VENIPUNCTURE: CPT

## 2025-05-17 ENCOUNTER — OFFICE VISIT (OUTPATIENT)
Dept: FAMILY MEDICINE CLINIC | Facility: CLINIC | Age: 65
End: 2025-05-17
Payer: COMMERCIAL

## 2025-05-17 VITALS
DIASTOLIC BLOOD PRESSURE: 68 MMHG | OXYGEN SATURATION: 97 % | SYSTOLIC BLOOD PRESSURE: 120 MMHG | HEIGHT: 66 IN | RESPIRATION RATE: 20 BRPM | HEART RATE: 58 BPM | WEIGHT: 107 LBS | BODY MASS INDEX: 17.19 KG/M2

## 2025-05-17 DIAGNOSIS — Z01.84 IMMUNITY STATUS TESTING: ICD-10-CM

## 2025-05-17 DIAGNOSIS — E78.2 MIXED HYPERLIPIDEMIA: ICD-10-CM

## 2025-05-17 DIAGNOSIS — I10 PRIMARY HYPERTENSION: ICD-10-CM

## 2025-05-17 DIAGNOSIS — Z00.00 WELLNESS EXAMINATION: Primary | ICD-10-CM

## 2025-05-17 DIAGNOSIS — C56.2 OVARIAN CANCER ON LEFT (HCC): ICD-10-CM

## 2025-05-17 DIAGNOSIS — N20.0 NEPHROLITHIASIS: ICD-10-CM

## 2025-05-17 DIAGNOSIS — R80.9 TYPE 2 DIABETES MELLITUS WITH MICROALBUMINURIA, WITHOUT LONG-TERM CURRENT USE OF INSULIN (HCC): ICD-10-CM

## 2025-05-17 DIAGNOSIS — E11.29 TYPE 2 DIABETES MELLITUS WITH MICROALBUMINURIA, WITHOUT LONG-TERM CURRENT USE OF INSULIN (HCC): ICD-10-CM

## 2025-05-17 DIAGNOSIS — F41.9 ANXIETY: ICD-10-CM

## 2025-05-17 DIAGNOSIS — B35.1 ONYCHOMYCOSIS: ICD-10-CM

## 2025-05-17 DIAGNOSIS — K59.00 CONSTIPATION, UNSPECIFIED CONSTIPATION TYPE: ICD-10-CM

## 2025-05-17 RX ORDER — TERBINAFINE HYDROCHLORIDE 250 MG/1
250 TABLET ORAL DAILY
Qty: 90 TABLET | Refills: 0 | Status: SHIPPED | OUTPATIENT
Start: 2025-05-17

## 2025-05-17 NOTE — PATIENT INSTRUCTIONS
VISIT SUMMARY:    Today, we discussed your kidney stones, recent lab results, and other health concerns. We reviewed your current medications and made some adjustments to your treatment plan. We also talked about general health maintenance and preventive measures.    YOUR PLAN:    -KIDNEY STONES: You have two small kidney stones that are not causing any blockage. It's important to stay well-hydrated. We will refer you to a urologist to evaluate the stones and consider shockwave therapy if they become problematic.    -TYPE 2 DIABETES MELLITUS WITHOUT COMPLICATIONS: Your blood sugar levels are well-controlled, and your A1c is at 6.0%. Continue with your current diabetes management plan. Please send us your eye exam report from your ophthalmologist in the fall.    -HYPERLIPIDEMIA: Your cholesterol levels have improved, although your LDL is still elevated. Continue with your current lipid management plan. We have ordered a heart scan to check for any plaque buildup in your arteries.     -ONYCHOMYCOSIS: You have a toenail fungus. We discussed treatment options, and you will start taking oral terbinafine once daily with food. We will monitor your liver function during this treatment. You will complete LFT lab in 4-6 weeks to ensure your levels are stable.     -GENERAL HEALTH MAINTENANCE: We discussed the importance of vaccinations, including shingles, pneumonia, and RSV. The shingles vaccine may also help reduce the risk of dementia. We will check your immunity to measles, mumps, and rubella (MMR) with a blood test.    -CONSTIPATION: Start metamucil or benefiber for constipation. You can use colace (stool softener) as needed as well as miralax (laxative) if having severe constipation. Make sure to increase fiber in your diet and stay well hydrated.     INSTRUCTIONS:    Please follow up with a urologist for your kidney stones evaluation. Continue with your current diabetes and lipid management plans. Start taking oral  terbinafine for your toenail fungus and monitor your liver function. Consider getting the shingles, pneumonia, and RSV vaccinations in the fall. Send us your eye exam report from your ophthalmologist in the fall. We will also order a blood test to check your immunity to MMR.

## 2025-05-17 NOTE — PROGRESS NOTES
Subjective:   Caorlina Humphreys is a 65 year old female who presents for Annual (Reviewed Preventative/Wellness form with patient./), Diabetes, and Hypertension     History/Other:   History of Present Illness  Carolina Humphreys is a 65 year old female who presents for annual physical.    She was recently diagnosed with kidney stones following an ultrasound of her kidneys. She was seen by our PA, Tonio Guillermo on 04/12/25 for UTI symptoms. An ultrasound was ordered which  revealed two stones, one measuring 5 mm and the other 6 mm. She was prescribed tamsulosin. She is concerned about the potential growth of the stones and their management.    She has a history of prediabetes and mentions a significant dietary intake of nuts and peanut butter over the past year and a half. Her recent lab results show a blood sugar level of 123 mg/dL and an A1c of 6.0%. Her triglycerides are now at 63 mg/dL, and her LDL cholesterol remains elevated but is lower than previous measurements. She takes fish oil daily and has incorporated fish into her diet once a week.    She reports occasional constipation and is considering daily management options. She has not tried Metamucil or Benefiber. She also mentions experiencing bubbles in her urine but has no proteinuria or other concerning findings in her urinalysis.    She has a history of toenail fungus, which she describes as a cosmetic concern. She wears nail polish regularly and noticed the condition after removing the polish. She is considering treatment options for the fungus.    She has a past medical history of ovarian cancer, specifically stage one, which was treated with a hysterectomy and chemotherapy. She has no current symptoms and follows up regularly with her gynecologist and for mammograms. She is concerned about the potential for undetected cancer and inquires about total body scans.    She reports good mental health, regular exercise, and adherence to a restricted diet. She  denies excessive alcohol use and no longer sees a diabetes clinic specialist as her condition is well-managed. She experiences occasional tingling in her feet at night but has no other significant symptoms.     Chief Complaint Reviewed and Verified  Nursing Notes Reviewed and   Verified  Allergies Reviewed  Medications Reviewed  OB Status Reviewed           Tobacco:  She has never smoked tobacco.    Current Medications[1]    PHQ-2 SCORE: 0  , done 5/17/2025     Review of Systems:  Pertinent items are noted in HPI.    Objective:   /68   Pulse 58   Resp 20   Ht 5' 6\" (1.676 m)   Wt 107 lb (48.5 kg)   SpO2 97%   BMI 17.27 kg/m²  Estimated body mass index is 17.27 kg/m² as calculated from the following:    Height as of this encounter: 5' 6\" (1.676 m).    Weight as of this encounter: 107 lb (48.5 kg).     Physical Exam  GENERAL: Alert, cooperative, well developed, no acute distress.  HEENT: Normocephalic, normal oropharynx, moist mucous membranes.  CHEST: Clear to auscultation bilaterally, no wheezes, rhonchi, or crackles.  CARDIOVASCULAR: Normal heart rate and rhythm, S1 and S2 normal without murmurs.  ABDOMEN: Soft, non-tender, non-distended, without organomegaly, normal bowel sounds.  EXTREMITIES: No cyanosis or edema, feet examined with no abnormalities.  NEUROLOGICAL: Cranial nerves grossly intact, moves all extremities without gross motor or sensory deficit, sensation intact.    Bilateral barefoot skin diabetic exam is normal, visualized feet and the appearance is normal. Onychomycosis visualized at multiple toenails.   Bilateral monofilament/sensation of both feet is normal.  Pulsation pedal pulse exam of both lower legs/feet is normal as well.    Assessment & Plan:   1. Wellness examination (Primary)  -Immunizations: Consider prevnar, shingrex, RSV.   -Metabolic: BMI 17. BP wnl. Due for annual labs   -Cancer screening: UTD  -Communicable disease: low risk   -Mental health: no concerns   -Other  preventative: follow with dentistry and optometry.   -Lifestyle: Follow a well balanced healthy diet with emphasis on fruits, vegetables, whole grains, lean meats. Limit processed and junk foods. Aim for at least 150 minutes of moderate intensity exercise weekly. Make sure you are staying adequately hydrated. Aim to get 7-9 hours of sleep nightly.     2. Type 2 diabetes mellitus with microalbuminuria, without long-term current use of insulin (HCC)  Diabetes: A1c is 6 done 5/9/2025 which shows Excellent control. Continue current meds and lifestyle modification.   DM Meds:       Diabetic Complications: MicroAlbuminuria: 48.5 mg/dL  Proteinuria: 48.5 mg/dL   Diabetes is : stable Continue current treatment regimen. Reassess Diabetes in : in 6 months    -     Comp Metabolic Panel (14); Future; Expected date: 11/13/2025  -     Lipid Panel; Future; Expected date: 11/13/2025  -     Hemoglobin A1C; Future; Expected date: 11/13/2025    3. Primary hypertension  BP shows good control with last BP of 120/68. Continue lifestyle changes, diet, exercise and weight loss.   5/9/2025: Potassium 3.7; Creatinine 0.69; eGFR-Cr 96  BP Meds: Irbesartan-hydroCHLOROthiazide Tabs - 150-12.5 MG; metoprolol succinate ER Tb24 - 50 MG     4. Mixed hyperlipidemia  Will check heart scan.   10yr ASCVD risk score 15.2% - can consider statin (she has declines in the past).   Continue low fat heart healthy diet, regular exercise, fish oil supplementation.     -     CT CALCIUM SCORING; Future; Expected date: 05/17/2025    5. Ovarian cancer on left (HCC)  Stable, no evidence of recurrence. S/p hysterectomy.     6. Onychomycosis  Toenail fungus confirmed. Oral terbinafine requires liver function monitoring.  - Start oral terbinafine once daily with food.  - Monitor liver function during treatment.  -     Terbinafine HCl; Take 1 tablet (250 mg total) by mouth daily.  Dispense: 90 tablet; Refill: 0  -     Hepatic Function Panel (7); Future; Expected date:  06/17/2025    7. Immunity status testing  -     MMR Panel([place] Immunity Panel); Future; Expected date: 05/17/2025    8. Constipation, unspecified constipation type  Counseled on increase water/fiber intake. Can start daily fiber supplementation. Can use colace, miralax as needed.     9. Nephrolithiasis  B/l nonobstructive renal stones seen on imaging. Has f/u with urology. Maintain good hydration.     10. Anxiety  Stable, CPM.       Return in about 6 months (around 11/17/2025).        Alonzo Martinez MD, 5/17/2025, 9:51 AM             [1]   Current Outpatient Medications   Medication Sig Dispense Refill    terbinafine 250 MG Oral Tab Take 1 tablet (250 mg total) by mouth daily. 90 tablet 0    TURMERIC OR Take by mouth As Directed.      cyanocobalamin 100 MCG Oral Tab Take 0.5 tablets (50 mcg total) by mouth daily.      zinc sulfate 220 (50 Zn) MG Oral Cap Take 1 capsule (220 mg total) by mouth daily.      Calcium-Vitamin D-Vitamin K (CALCIUM SOFT CHEWS OR) Take by mouth.      cholecalciferol 50 MCG (2000 UT) Oral Cap Take 1 capsule (2,000 Units total) by mouth daily.      Menaquinone-7 (VITAMIN K2 OR) Take 100 Units by mouth.      metoprolol succinate ER 50 MG Oral Tablet 24 Hr Take 1 tablet (50 mg total) by mouth daily. 90 tablet 1    ALPRAZolam ER 0.5 MG Oral Tablet 24 Hr Take 1 tablet (0.5 mg total) by mouth at bedtime. 30 tablet 5    Irbesartan-hydroCHLOROthiazide 150-12.5 MG Oral Tab Take 0.5 tablets by mouth daily. 45 tablet 1    FREESTYLE EULA 3 SENSOR Does not apply Misc USE AS DIRECTED AND CHANGE EVERY 14 DAYS 2 each 1    Multiple Vitamins-Minerals (CENTRUM SILVER 50+WOMEN OR) Take by mouth.      Omega-3 Fatty Acids (FISH OIL) 500 MG Oral Cap Take by mouth.

## 2025-05-17 NOTE — PROGRESS NOTES
The following individual(s) verbally consented to be recorded using ambient AI listening technology and understand that they can each withdraw their consent to this listening technology at any point by asking the clinician to turn off or pause the recording:    Patient name: Carolinamichelle Humphreys  Additional names:

## 2025-05-19 PROBLEM — F41.9 ANXIETY: Status: ACTIVE | Noted: 2025-05-19

## 2025-05-19 PROBLEM — Z53.20 STATIN DECLINED: Status: ACTIVE | Noted: 2025-05-19

## 2025-06-04 NOTE — H&P
HPI:     Carolina Humphreys is a 65 year old female with a PMH of HTN, pre-DM, ovarian ca.  She presents as a consult with:  1. Possible b/l kidney stones on US  - no prior episodes  2. Recurrent UTIs  - one in Mar 2025, nothing for > 5 years    PCP - RIGO Martinez    Had UTI in Mar 2025 (frequency, urgency, dysuria).  Had RBUS for pelvic pressure with findings below. Took tamsulosin for 1 mo without benefit.  She feels well. Appetite and energy are good.    UA is negative    RBUS 4/9/25: 6 RLP, 5 LLP    Gross hematuria: none  Tobacco hx: none  Kidney stone hx: none  Fam h/o  malignancy: none    Drinks ~ 40-60 oz water, occasional coffee with light yellow urine. I encouraged the pt drink at least 40-60 oz water per day or enough to keep urine clear to pale yellow for stone prevention.    For hematuria I recommend we proceed with CT SP.     We discussed that RBUS is not a reliable test to evaluate for the size and presence of stones.     Will check CTU as next step (has h/o ovarian ca in remission) and I will contact her with results (does not check mychart).    HISTORY:  Past Medical History[1]   Past Surgical History[2]   Family History[3]   Social History: Short Social Hx on File[4]     Medications (Active prior to today's visit):  Current Medications[5]    Allergies:  Allergies[6]      ROS:     A comprehensive 10 point review of systems was completed.  Pertinent positives and negatives noted in the the HPI.    PHYSICAL EXAM:     GENERAL APPEARANCE: well, developed, well nourished, in no acute distress  NEUROLOGIC: nonfocal, alert and oriented  HEAD: normocephalic, atraumatic  EYES: sclera non-icteric  EARS: hearing intact  ORAL CAVITY: mucosa moist  NECK/THYROID: no obvious goiter or masses  LUNGS: nonlabored breathing  ABDOMEN: soft, no obvious masses or tenderness  SKIN: no obvious rashes    : as noted above     ASSESSMENT/PLAN:   Diagnoses and all orders for this visit:    Recurrent kidney  stones    Asymptomatic microscopic hematuria  -     CT UROGRAM(W+WO) W/3D(CPT=74178/97928); Future    - as noted above.    Thanks again for this consult.    Trent Christianson MD, FACS  Urologist  [facility]  Office: [phone]              [1]   Past Medical History:   Cancer (HCC)    Ovarian    Pre-diabetes   [2]   Past Surgical History:  Procedure Laterality Date    Hysterectomy  2001   [3] History reviewed. No pertinent family history.  [4]   Social History  Socioeconomic History    Marital status:    Tobacco Use    Smoking status: Never    Smokeless tobacco: Never   Vaping Use    Vaping status: Never Used   Substance and Sexual Activity    Alcohol use: Not Currently    Drug use: Never   [5]   Current Outpatient Medications   Medication Sig Dispense Refill    terbinafine 250 MG Oral Tab Take 1 tablet (250 mg total) by mouth daily. 90 tablet 0    TURMERIC OR Take by mouth As Directed.      cyanocobalamin 100 MCG Oral Tab Take 0.5 tablets (50 mcg total) by mouth daily.      zinc sulfate 220 (50 Zn) MG Oral Cap Take 1 capsule (220 mg total) by mouth daily.      Calcium-Vitamin D-Vitamin K (CALCIUM SOFT CHEWS OR) Take by mouth.      cholecalciferol 50 MCG (2000 UT) Oral Cap Take 1 capsule (2,000 Units total) by mouth daily.      Menaquinone-7 (VITAMIN K2 OR) Take 100 Units by mouth.      ALPRAZolam ER 0.5 MG Oral Tablet 24 Hr Take 1 tablet (0.5 mg total) by mouth at bedtime. 30 tablet 5    Irbesartan-hydroCHLOROthiazide 150-12.5 MG Oral Tab Take 0.5 tablets by mouth daily. 45 tablet 1    FREESTYLE EULA 3 SENSOR Does not apply Misc USE AS DIRECTED AND CHANGE EVERY 14 DAYS 2 each 1    Multiple Vitamins-Minerals (CENTRUM SILVER 50+WOMEN OR) Take by mouth.      Omega-3 Fatty Acids (FISH OIL) 500 MG Oral Cap Take by mouth.      metoprolol succinate ER 50 MG Oral Tablet 24 Hr Take 1 tablet (50 mg total) by mouth daily. (Patient not taking: Reported on 6/10/2025) 90 tablet 1   [6]   Allergies  Allergen  Reactions    Levofloxacin PALPITATIONS    Penicillins UNKNOWN    Venlafaxine OTHER (SEE COMMENTS)

## 2025-06-10 ENCOUNTER — OFFICE VISIT (OUTPATIENT)
Dept: SURGERY | Facility: CLINIC | Age: 65
End: 2025-06-10

## 2025-06-10 DIAGNOSIS — N20.0 RECURRENT KIDNEY STONES: Primary | ICD-10-CM

## 2025-06-10 DIAGNOSIS — R31.21 ASYMPTOMATIC MICROSCOPIC HEMATURIA: ICD-10-CM

## 2025-06-10 PROCEDURE — 99203 OFFICE O/P NEW LOW 30 MIN: CPT | Performed by: UROLOGY

## 2025-06-10 PROCEDURE — G2211 COMPLEX E/M VISIT ADD ON: HCPCS | Performed by: UROLOGY

## 2025-06-19 DIAGNOSIS — I10 PRIMARY HYPERTENSION: ICD-10-CM

## 2025-06-19 DIAGNOSIS — F41.9 ANXIETY: ICD-10-CM

## 2025-06-23 ENCOUNTER — TELEPHONE (OUTPATIENT)
Dept: ENDOCRINOLOGY CLINIC | Facility: CLINIC | Age: 65
End: 2025-06-23

## 2025-06-23 RX ORDER — ALPRAZOLAM 0.5 MG
0.5 TABLET ORAL NIGHTLY PRN
Qty: 30 TABLET | Refills: 3 | Status: SHIPPED | OUTPATIENT
Start: 2025-06-23

## 2025-06-23 RX ORDER — IRBESARTAN AND HYDROCHLOROTHIAZIDE 150; 12.5 MG/1; MG/1
0.5 TABLET, FILM COATED ORAL DAILY
Qty: 45 TABLET | Refills: 3 | Status: SHIPPED | OUTPATIENT
Start: 2025-06-23

## 2025-06-23 RX ORDER — METOPROLOL SUCCINATE 50 MG/1
50 TABLET, EXTENDED RELEASE ORAL DAILY
Qty: 90 TABLET | Refills: 3 | Status: SHIPPED | OUTPATIENT
Start: 2025-06-23

## 2025-06-23 NOTE — TELEPHONE ENCOUNTER
Refill passed per Clinic protocol.  Requested Prescriptions   Pending Prescriptions Disp Refills    ALPRAZOLAM 0.5 MG Oral Tab [Pharmacy Med Name: Alprazolam 0.5 Mg Tab Acta] 30 tablet 0     Sig: Take 1 tablet by mouth nightly as needed for Sleep.       Controlled Substance Medication Failed - 6/23/2025  7:13 AM        Failed - This medication is a controlled substance - forward to provider to refill        Failed - Medication is active on med list          METOPROLOL SUCCINATE ER 50 MG Oral Tablet 24 Hr [Pharmacy Med Name: Metoprolol Succinate Er 24hr 50 Mg Tab Nort] 90 tablet 0     Sig: TAKE ONE TABLET BY MOUTH ONCE DAILY       Hypertension Medications Protocol Passed - 6/23/2025  7:13 AM        Passed - CMP or BMP in past 12 months        Passed - Last BP reading less than 140/90     BP Readings from Last 1 Encounters:   05/17/25 120/68               Passed - In person appointment or virtual visit in the past 12 mos or appointment in next 3 mos     Recent Outpatient Visits              1 week ago Recurrent kidney stones    [facility], [facility] Trent Christianson MD    Office Visit    1 month ago Wellness examination    [facility], [place] [id number], Alonzo Cosme MD    Office Visit    2 months ago UTI symptoms    [facility], [place] [id number] Ryan, PA-C    Office Visit    3 months ago UTI symptoms    [facility], [place] [id number] Ryan, PA-C    Office Visit    7 months ago Type 2 diabetes mellitus with microalbuminuria, without long-term current use of insulin ([facility])    [facility], [facility] [address] Arpit Devendra, MD    Office Visit                      Passed - EGFRCR or GFRNAA > 50     GFR Evaluation  EGFRCR: 96 , resulted on 5/9/2025          Passed - Medication is active on med list          IRBESARTAN-HYDROCHLOROTHIAZIDE 150-12.5 MG  Oral Tab [Pharmacy Med Name: Irbesartan/Hydrochlorothiazide 150-12.5 Mg Tab Macl] 45 tablet 0     Sig: Take 1/2 tablet by mouth daily.       Hypertension Medications Protocol Passed - 6/23/2025  7:13 AM        Passed - CMP or BMP in past 12 months        Passed - Last BP reading less than 140/90     BP Readings from Last 1 Encounters:   05/17/25 120/68               Passed - In person appointment or virtual visit in the past 12 mos or appointment in next 3 mos     Recent Outpatient Visits              1 week ago Recurrent kidney stones    [facility]    Office Visit    1 month ago Wellness examination    [facility], [facility], Alonzo Cosme MD    Office Visit    2 months ago UTI symptoms    [facility], [facility], Tonio Pinto PA-C    Office Visit    3 months ago UTI symptoms    [facility], [facility], Tonio Pinto PA-C    Office Visit    7 months ago Type 2 diabetes mellitus with microalbuminuria, without long-term current use of insulin ([facility])    [facility], [facility], Alonzo Cosme MD    Office Visit                      Passed - EGFRCR or GFRNAA > 50     GFR Evaluation  EGFRCR: 96 , resulted on 5/9/2025          Passed - Medication is active on med list

## 2025-06-23 NOTE — TELEPHONE ENCOUNTER
Please review; protocol failed/No Protocol      Recent Fills: 02/23/2025, 04/07/2025, 05/14/2025    Last Rx Written: 12/04/2024    Last Office Visit: 05/17/2025

## 2025-06-23 NOTE — TELEPHONE ENCOUNTER
Office visit on 03/11/2025:    ALPRAZolam      0.5 mg Oral Nightly     (Duplicate therapy)  Patient not taking: Reported on 3/11/2025

## 2025-06-27 DIAGNOSIS — E11.29 TYPE 2 DIABETES MELLITUS WITH MICROALBUMINURIA, WITHOUT LONG-TERM CURRENT USE OF INSULIN (HCC): Primary | ICD-10-CM

## 2025-06-27 DIAGNOSIS — R80.9 TYPE 2 DIABETES MELLITUS WITH MICROALBUMINURIA, WITHOUT LONG-TERM CURRENT USE OF INSULIN (HCC): Primary | ICD-10-CM

## 2025-06-27 RX ORDER — HYDROCHLOROTHIAZIDE 12.5 MG/1
2 CAPSULE ORAL
Qty: 6 EACH | Refills: 1 | Status: SHIPPED | OUTPATIENT
Start: 2025-06-27

## 2025-06-27 NOTE — TELEPHONE ENCOUNTER
Patient left message - requested call back - has a question for medication/provider.     Called patient back - patient advised pharmacy told her Aryan 3 was being discontinued, when they were running prescription through insurance was costing $300 versus $75 with coupon. She knew that [employer]uri had previously provided a coupon, wanted to see if she can get one for the Aryan 3 +. Patient requested printed coupon and possibly printed prescription as well to send to bring to new pharmacy. She did agree to sending prescription electronically to [facility] if that is easier.     Patient requested to come to [place] office on Tuesday between 4-4:15 pm, on nurse visit schedule, advised we will let her know if that will not work - coupon through link below if able to print in [place] office for patient on Tuesday?:   [email or web address]     Future Appointments   Date Time Provider Department Center   6/30/2025  3:45 PM [facility] S [facility] NURSE [id number]   7/2/2025  7:00 PM PF CT RM1 PF CT [place]

## 2025-06-30 ENCOUNTER — TELEPHONE (OUTPATIENT)
Facility: CLINIC | Age: 65
End: 2025-06-30

## 2025-06-30 NOTE — TELEPHONE ENCOUNTER
Patient called in, left message.  Returned call, informed patient copay information for Aryan card was provided to pharmacy.  She does not need to come to office to .  Patient last visit with BRYNN Keating was 08/2024 and patient is now being managed by Primary Care Physician for diabetes.

## 2025-06-30 NOTE — TELEPHONE ENCOUNTER
Pharmacy received a Rx for Freestyle nidia sensor with directions to use sensor every 15 days. Please call [facility] at [phone] to verify.

## 2025-07-02 ENCOUNTER — HOSPITAL ENCOUNTER (OUTPATIENT)
Dept: CT IMAGING | Age: 65
Discharge: HOME | End: 2025-07-02
Attending: UROLOGY
Payer: COMMERCIAL

## 2025-07-02 DIAGNOSIS — R31.21 ASYMPTOMATIC MICROSCOPIC HEMATURIA: ICD-10-CM

## 2025-07-02 LAB
CREAT BLD-MCNC: 0.7 MG/DL (ref 0.55–1.02)
EGFRCR SERPLBLD CKD-EPI 2021: 95 ML/MIN/1.73M2 (ref 60–?)

## 2025-07-02 PROCEDURE — 82565 ASSAY OF CREATININE: CPT

## 2025-07-02 PROCEDURE — 74178 CT ABD&PLV WO CNTR FLWD CNTR: CPT | Performed by: UROLOGY

## 2025-07-02 PROCEDURE — 76377 3D RENDER W/INTRP POSTPROCES: CPT | Performed by: UROLOGY

## 2025-07-03 ENCOUNTER — TELEPHONE (OUTPATIENT)
Dept: FAMILY MEDICINE CLINIC | Facility: CLINIC | Age: 65
End: 2025-07-03

## 2025-07-03 ENCOUNTER — TELEPHONE (OUTPATIENT)
Dept: SURGERY | Facility: CLINIC | Age: 65
End: 2025-07-03

## 2025-07-03 DIAGNOSIS — N63.0 BREAST NODULE: Primary | ICD-10-CM

## 2025-07-03 DIAGNOSIS — Z12.31 SCREENING MAMMOGRAM FOR BREAST CANCER: ICD-10-CM

## 2025-07-03 NOTE — TELEPHONE ENCOUNTER
Called pt and informed of below  Pt states she has a \"pimple-like\" on sternum   Asked if pt does self-breast examinations and pt stated yes  Denies feeling any lumps to breast   Advised pt to schedule appt with Dr. Martinez for evaluation   Questions answered  Pt verbalized understanding     This encounter is now closed

## 2025-07-03 NOTE — TELEPHONE ENCOUNTER
----- Message from Trent Christianson sent at 7/3/2025  8:47 AM CDT -----  Results reviewed. Please inform patient does not have kidney stones which is great news she does have a breast lesion on CT but CT is not a good test to evaluate breast lesions.  This is out of my   area of expertise so I recommend she speak to her primary care physician about this.  I reached out to Dr. Martinez as well to let him know so his office may be reaching out.    Thanks,  MPH    Below if for Documentation Purposes Only:  CTU 7/3/25: no stones. Cystocele. Breast nodule L of midline  ----- Message -----  From: Interface, [facility] Rad In  Sent: 7/3/2025   8:04 AM CDT  To: Trent Christianson MD

## 2025-07-03 NOTE — PROGRESS NOTES
Results reviewed. Please inform patient does not have kidney stones which is great news she does have a breast lesion on CT but CT is not a good test to evaluate breast lesions.  This is out of my area of expertise so I recommend she speak to her primary care physician about this.  I reached out to Dr. Martinez as well to let him know so his office may be reaching out.    Thanks,  MPH    Below if for Documentation Purposes Only:  CTU 7/3/25: no stones. Cystocele. Breast nodule L of midline

## 2025-07-03 NOTE — TELEPHONE ENCOUNTER
Triage - please notify patient that possible breast nodule found on CT urogram. Needs to complete mammogram, US. Orders placed.

## 2025-07-03 NOTE — TELEPHONE ENCOUNTER
Sujit Rosado,  I got a CT scan for this patient for possible stones on ultrasound.  CT shows no stones which is great news but it incidentally shows a L sided breast nodule and radiology is suggesting a breast ultrasound.  I have never ordered this before and is out of my area of expertise.  Do you mind ordering or can you tell me what I should order as a next step?    Thanks,  Korey

## 2025-07-03 NOTE — TELEPHONE ENCOUNTER
Pt called stating she had CT urogram done yesterday and they found a breast nodule. Dr. Martinez ordered a screening mammogram. Pt called to schedule the appt and was told she is not due for her annual screening until November. Dr. Martinez will need to order a diagnostic mammogram for pt to have this completed sooner.

## 2025-07-03 NOTE — TELEPHONE ENCOUNTER
Spoke with pt to inform of breast nodule findings on CT urogram ordered by Dr. Christianson as per PCP. Provided CS# to schedule mammogram and US.  Pt inquiring of rest of CT results- advised to contact Dr. Christianson to further discuss results. Provided Uro contact info. Pt verbalized understanding and agreed with POC.

## 2025-07-07 NOTE — TELEPHONE ENCOUNTER
Called patient with provider response. She states the area of concern is more in between breasts on sternum. She reports a dark area that was assessed previously by a doctor and patient was told to go to Derm to have it removed. Advised patient the order for Diagnostic mammogram also includes an ultrasound and an ultrasound would be able to assess area in between breasts. Patient would like to further discuss with Dr Martinez. Appointment scheduled after 3:00 per patient request. She will schedule mammogram as well.

## 2025-07-14 ENCOUNTER — OFFICE VISIT (OUTPATIENT)
Dept: FAMILY MEDICINE CLINIC | Facility: CLINIC | Age: 65
End: 2025-07-14
Payer: COMMERCIAL

## 2025-07-14 VITALS
RESPIRATION RATE: 18 BRPM | OXYGEN SATURATION: 98 % | HEIGHT: 66 IN | SYSTOLIC BLOOD PRESSURE: 134 MMHG | DIASTOLIC BLOOD PRESSURE: 74 MMHG | HEART RATE: 76 BPM | BODY MASS INDEX: 17.04 KG/M2 | WEIGHT: 106 LBS

## 2025-07-14 DIAGNOSIS — E11.29 TYPE 2 DIABETES MELLITUS WITH MICROALBUMINURIA, WITHOUT LONG-TERM CURRENT USE OF INSULIN (HCC): ICD-10-CM

## 2025-07-14 DIAGNOSIS — N81.10 CYSTOCELE WITHOUT UTERINE PROLAPSE: ICD-10-CM

## 2025-07-14 DIAGNOSIS — L72.0 EPIDERMOID CYST: Primary | ICD-10-CM

## 2025-07-14 DIAGNOSIS — R80.9 TYPE 2 DIABETES MELLITUS WITH MICROALBUMINURIA, WITHOUT LONG-TERM CURRENT USE OF INSULIN (HCC): ICD-10-CM

## 2025-07-14 DIAGNOSIS — N28.1 RENAL CYST: ICD-10-CM

## 2025-07-14 DIAGNOSIS — E27.9 ADRENAL NODULE (HCC): ICD-10-CM

## 2025-07-14 PROCEDURE — 3075F SYST BP GE 130 - 139MM HG: CPT | Performed by: FAMILY MEDICINE

## 2025-07-14 PROCEDURE — 3008F BODY MASS INDEX DOCD: CPT | Performed by: FAMILY MEDICINE

## 2025-07-14 PROCEDURE — 99213 OFFICE O/P EST LOW 20 MIN: CPT | Performed by: FAMILY MEDICINE

## 2025-07-14 PROCEDURE — 3078F DIAST BP <80 MM HG: CPT | Performed by: FAMILY MEDICINE

## 2025-07-16 NOTE — PROGRESS NOTES
Subjective:   Carolina Humphreys is a 65 year old female who presents for Follow - Up (Discuss recent CT imaging )     History/Other:   History of Present Illness  Carolina Humphreys is a 65 year old female who presents with concerns about a nodule near her sternum and recent imaging findings.    She initially sought evaluation for kidney stones, during which a nodule was identified. It was initially thought to be on her left breast but was later clarified to be on her sternum. She suspects it might be a pimple, as she has had a similar lesion in that area before.    A CT scan revealed a nodule anterior to the sternum. During the CT scan for kidney stones, she was informed that she does not have kidney stones but rather cysts on both kidneys, each measuring 5 millimeters or less. Additionally, a nodule on her right adrenal gland, measuring 1.6 by 1.5 centimeters, was found.    She has a history of a prolapsed bladder, which is currently asymptomatic and monitored by her gynecologist. No significant symptoms are reported from this condition.    She recalls a similar cyst on her back approximately 25 years ago, which was removed by a dermatologist.    Regarding her diabetes management, she is attempting to control it through diet to avoid medication. She acknowledges the challenges of dietary restrictions.   Chief Complaint Reviewed and Verified  Nursing Notes Reviewed and   Verified  Tobacco Reviewed  Allergies Reviewed  Medications Reviewed    Medical History Reviewed  Surgical History Reviewed  OB Status Reviewed    Family History Reviewed  Social History Reviewed         Tobacco:  She has never smoked tobacco.    Current Medications[1]    Review of Systems:  Pertinent items are noted in HPI.    Objective:   /74   Pulse 76   Resp 18   Ht 5' 6\" (1.676 m)   Wt 106 lb (48.1 kg)   SpO2 98%   BMI 17.11 kg/m²  Estimated body mass index is 17.11 kg/m² as calculated from the following:    Height as of  this encounter: 5' 6\" (1.676 m).    Weight as of this encounter: 106 lb (48.1 kg).  Physical Exam  GENERAL: Alert, cooperative, well developed, no acute distress.  CHEST WALL: Epidermoid cyst on left lower sternum.  EXTREMITIES: No edema.  NEUROLOGICAL: No focal deficits      Assessment & Plan:   1. Epidermoid cyst (Primary)  2. Cystocele without uterine prolapse  3. Adrenal nodule (HCC)  4. Renal cyst  5. Type 2 diabetes mellitus with microalbuminuria, without long-term current use of insulin (HCC)    Assessment & Plan  Epidermoid cyst  Nodule anterior to sternum identified as epidermoid cyst. Similar cyst on back 25 years ago.  - Refer to dermatologist for cyst removal.    Cystocele  Small, asymptomatic prolapsed bladder under gynecological care.     Adrenal adenoma  1.6 x 1.5 cm nodule in right adrenal gland consistent with benign adrenal adenoma. Radiology recommends monitoring.  - Schedule follow-up CT scan in one year to monitor the adrenal adenoma.    Renal cysts  Multiple renal cysts, likely benign.     Diabetes mellitus  Diabetes well-controlled through diet. Last A1c value was 6% done 5/9/2025.  - Continue current dietary management.  - Schedule blood work in November.          Return if symptoms worsen or fail to improve.        Alonzo Martinez MD, 7/15/2025, 9:11 PM             [1]   Current Outpatient Medications   Medication Sig Dispense Refill    Continuous Glucose Sensor (FREESTYLE EULA 3 PLUS SENSOR) Does not apply Misc 2 each every 15 (fifteen) days. 6 each 1    ALPRAZolam 0.5 MG Oral Tab Take 1 tablet (0.5 mg total) by mouth nightly as needed for Sleep. 30 tablet 3    metoprolol succinate ER 50 MG Oral Tablet 24 Hr Take 1 tablet (50 mg total) by mouth daily. 90 tablet 3    Irbesartan-hydroCHLOROthiazide 150-12.5 MG Oral Tab Take 0.5 tablets by mouth daily. 45 tablet 3    terbinafine 250 MG Oral Tab Take 1 tablet (250 mg total) by mouth daily. 90 tablet 0    TURMERIC OR Take by mouth As  Directed.      cyanocobalamin 100 MCG Oral Tab Take 0.5 tablets (50 mcg total) by mouth daily.      zinc sulfate 220 (50 Zn) MG Oral Cap Take 1 capsule (220 mg total) by mouth daily.      Calcium-Vitamin D-Vitamin K (CALCIUM SOFT CHEWS OR) Take by mouth.      cholecalciferol 50 MCG (2000 UT) Oral Cap Take 1 capsule (2,000 Units total) by mouth daily.      Menaquinone-7 (VITAMIN K2 OR) Take 100 Units by mouth.      ALPRAZolam ER 0.5 MG Oral Tablet 24 Hr Take 1 tablet (0.5 mg total) by mouth at bedtime. 30 tablet 5    FREESTYLE EULA 3 SENSOR Does not apply Misc USE AS DIRECTED AND CHANGE EVERY 14 DAYS 2 each 1    Multiple Vitamins-Minerals (CENTRUM SILVER 50+WOMEN OR) Take by mouth.      Omega-3 Fatty Acids (FISH OIL) 500 MG Oral Cap Take by mouth.

## 2025-08-15 ENCOUNTER — TELEPHONE (OUTPATIENT)
Dept: FAMILY MEDICINE CLINIC | Facility: CLINIC | Age: 65
End: 2025-08-15